# Patient Record
Sex: MALE | Race: WHITE | NOT HISPANIC OR LATINO | Employment: FULL TIME | URBAN - METROPOLITAN AREA
[De-identification: names, ages, dates, MRNs, and addresses within clinical notes are randomized per-mention and may not be internally consistent; named-entity substitution may affect disease eponyms.]

---

## 2017-02-13 NOTE — PROGRESS NOTES
"  SUBJECTIVE:                                                    Sang Koroma is a 41 year old male who presents to clinic today for the following health issues:      HPI    WART(S)     Onset: Had it for a while    Description:   Location: right and left groin, several on skin on bottom to the right of penis, 2 on side of penis, 1 on pelvic region  Number of warts: 11  Painful: no    Accompanying Signs & Symptoms:  Signs of infection: no   History:   History of trauma: no  Prior warts: no         Therapies Tried and outcome: aldara cream - doesn't work; Apple cider vinegar - worked, but burned other parts of genitals.    Problem list and histories reviewed & adjusted, as indicated.  Additional history: as documented    Labs reviewed in EPIC  Problem list, Medication list, Allergies, and Medical/Social/Surgical histories reviewed in [facility] and updated as appropriate.    ROS:  Constitutional, HEENT, cardiovascular, pulmonary, gi and gu systems are negative, except as otherwise noted.    OBJECTIVE:                                                    /80 (BP Location: Right arm, Patient Position: Chair, Cuff Size: Adult Regular)  Pulse 59  Temp 98.4  F (36.9  C) (Temporal)  Resp 18  Ht 5' 7.24\" (1.708 m)  Wt 204 lb 9.6 oz (92.8 kg)  SpO2 100%  BMI 31.81 kg/m2  Body mass index is 31.81 kg/(m^2).  GENERAL: healthy, alert and no distress  MS: no gross musculoskeletal defects noted, no edema  SKIN: 11 warts - genitalia, including 1 right inguinal, 7 right perineum, 2 shaft of penis, 1 left perineum  PSYCH: mentation appears normal, affect normal/bright    Diagnostic Test Results:  none      ASSESSMENT/PLAN:                                                      Sang was seen today for wart and panel management.    Diagnoses and all orders for this visit:    Genital warts  -     DESTRUCT BENIGN LESION, UP TO 14    Patient has tried imiquimod cream for several weeks and then tried to burn the warts with apple " cider vinegar at home.  The vinegar helped a little but did not get rid of warts.  Discussed options of referral to derm for laser removal or using liquid nitrogen in clinic today.  Discussed pain associated with liquid nitrogen treatment and healing process including blistering and pain.  Patient understands risks and wants to proceed with liquid nitrogen destruction of warts.  Return to clinic in one month if warts persist to re-treat.    Procedure note:   Procedure was discussed with patient. Site(s) as described above were identified and cleaned with alcohol.      The site(s) were identified. The canister was held in an upright position with the cryotip nozzle approximately 1 cm away from eash  lesion before the trigger is pressed to activate flow of Liquid Nitrogen.  Three to four freeze-thaw cycles were given with duration of 4-5 sec each till a frost rim of 1mm is noticed around the lesion. A total of 11 warts were treated    See Patient Instructions  Patient Instructions   - Area may blister, if it does, do not pop  - Can re-treat in clinic in one month   - once the blister has healed, if there are still warts, you may use imiquimod or return for another freeze cycle    Lakia Vazquez CNP  [phone]        Guadalupe Vazquez, RHYS [facility]

## 2017-02-15 ENCOUNTER — OFFICE VISIT (OUTPATIENT)
Dept: FAMILY MEDICINE | Facility: OTHER | Age: 42
End: 2017-02-15
Payer: COMMERCIAL

## 2017-02-15 VITALS
BODY MASS INDEX: 32.11 KG/M2 | DIASTOLIC BLOOD PRESSURE: 80 MMHG | TEMPERATURE: 98.4 F | OXYGEN SATURATION: 100 % | HEIGHT: 67 IN | RESPIRATION RATE: 18 BRPM | WEIGHT: 204.6 LBS | SYSTOLIC BLOOD PRESSURE: 118 MMHG | HEART RATE: 59 BPM

## 2017-02-15 DIAGNOSIS — A63.0 GENITAL WARTS: Primary | ICD-10-CM

## 2017-02-15 PROCEDURE — 54065 DESTRUCTION PENIS LESION(S): CPT | Performed by: STUDENT IN AN ORGANIZED HEALTH CARE EDUCATION/TRAINING PROGRAM

## 2017-02-15 ASSESSMENT — PAIN SCALES - GENERAL: PAINLEVEL: NO PAIN (0)

## 2017-02-15 NOTE — NURSING NOTE
"Chief Complaint   Patient presents with     Wart     genital warts     Panel Management     mychart, height, flu, phq9       Initial /80 (BP Location: Right arm, Patient Position: Chair, Cuff Size: Adult Regular)  Pulse 59  Temp 98.4  F (36.9  C) (Temporal)  Resp 18  Ht 5' 7.24\" (1.708 m)  Wt 204 lb 9.6 oz (92.8 kg)  SpO2 100%  BMI 31.81 kg/m2 Estimated body mass index is 31.81 kg/(m^2) as calculated from the following:    Height as of this encounter: 5' 7.24\" (1.708 m).    Weight as of this encounter: 204 lb 9.6 oz (92.8 kg).  Medication Reconciliation: complete   Ruth Truong CMA      "

## 2017-02-15 NOTE — MR AVS SNAPSHOT
"              After Visit Summary   2/15/2017    Sang Koroma    MRN: [id number]           Patient Information     Date Of Birth          1975        Visit Information        Provider Department      2/15/2017 4:00 PM Guadalupe Vazquez APRN CNP [facility]        Care Instructions    - Area may blister, if it does, do not pop  - Can re-treat in clinic in one month   - once the blister has healed, if there are still warts, you may use imiquimod or return for another freeze cycle    Lakia Vazquez CNP  [phone]          Follow-ups after your visit        Who to contact     If you have questions or need follow up information about today's clinic visit or your schedule please contact [facility] directly at [phone].  Normal or non-critical lab and imaging results will be communicated to you by MyChart, letter or phone within 4 business days after the clinic has received the results. If you do not hear from us within 7 days, please contact the clinic through MyChart or phone. If you have a critical or abnormal lab result, we will notify you by phone as soon as possible.  Submit refill requests through Payment plugin or call your pharmacy and they will forward the refill request to us. Please allow 3 business days for your refill to be completed.          Additional Information About Your Visit        MyChart Information     Payment plugin lets you send messages to your doctor, view your test results, renew your prescriptions, schedule appointments and more. To sign up, go to [email or web address] . Click on \"Log in\" on the left side of the screen, which will take you to the Welcome page. Then click on \"Sign up Now\" on the right side of the page.     You will be asked to enter the access code listed below, as well as some personal information. Please follow the directions to create your username and password.     Your access code is: [email or web address]  Expires: 5/16/2017  4:31 " "PM     Your access code will  in 90 days. If you need help or a new code, please call your [facility] or [phone].        Care EveryWhere ID     This is your Care EveryWhere ID. This could be used by other organizations to access your [place] medical records  [id number]        Your Vitals Were     Pulse Temperature Respirations Height Pulse Oximetry BMI (Body Mass Index)    59 98.4  F (36.9  C) (Temporal) 18 5' 7.24\" (1.708 m) 100% 31.81 kg/m2       Blood Pressure from Last 3 Encounters:   02/15/17 118/80   16 106/70   16 118/80    Weight from Last 3 Encounters:   02/15/17 204 lb 9.6 oz (92.8 kg)   16 189 lb (85.7 kg)   16 196 lb 6.4 oz (89.1 kg)              Today, you had the following     No orders found for display       Primary Care Provider Office Phone # Fax #    Mian Hunt [phone]       [address] [address]        Thank you!     Thank you for choosing [facility]  for your care. Our goal is always to provide you with excellent care. Hearing back from our patients is one way we can continue to improve our services. Please take a few minutes to complete the written survey that you may receive in the mail after your visit with us. Thank you!             Your Updated Medication List - Protect others around you: Learn how to safely use, store and throw away your medicines at www.disposemymeds.org.          This list is accurate as of: 2/15/17  4:31 PM.  Always use your most recent med list.                   Brand Name Dispense Instructions for use    acyclovir 400 MG tablet    ZOVIRAX    180 tablet    TAKE 1 TABLET TWICE A DAY       imiquimod 5 % cream    ALDARA    12 packet    Apply a small sized amount to warts three times weekly at bedtime.   Wash off after 8 hours.   May use for up to 16 weeks.       indomethacin 25 MG capsule    INDOCIN    42 capsule    Take 1 capsule (25 mg) by " mouth 3 times daily (with meals)       Multi-vitamin Tabs tablet   Generic drug:  multivitamin, therapeutic with minerals      Take 1 tablet by mouth daily Reported on 2/15/2017       predniSONE 20 MG tablet    DELTASONE    5 tablet    Take 1 tablet (20 mg) by mouth daily

## 2017-02-15 NOTE — PATIENT INSTRUCTIONS
- Area may blister, if it does, do not pop  - Can re-treat in clinic in one month   - once the blister has healed, if there are still warts, you may use imiquimod or return for another freeze cycle    Lakia Vazquez, CNP  [phone]

## 2017-02-16 ASSESSMENT — PATIENT HEALTH QUESTIONNAIRE - PHQ9: SUM OF ALL RESPONSES TO PHQ QUESTIONS 1-9: 1

## 2017-05-27 DIAGNOSIS — A60.00 HERPES SIMPLEX INFECTION OF GENITOURINARY SYSTEM: ICD-10-CM

## 2017-05-31 RX ORDER — ACYCLOVIR 400 MG/1
TABLET ORAL
Qty: 180 TABLET | Refills: 0 | Status: SHIPPED | OUTPATIENT
Start: 2017-05-31 | End: 2017-09-21

## 2017-05-31 NOTE — TELEPHONE ENCOUNTER
Prescription approved per [facility] Refill Protocol.  Patient due for labs for further refills.  Lien Youngblood RN - BC

## 2017-05-31 NOTE — TELEPHONE ENCOUNTER
acyclovir (ZOVIRAX) 400 MG tablet     Last Written Prescription Date: 12/20/2016  Last Fill Quantity: 180, # refills: 1  Last Office Visit with FMLISA, NORIS or [facility] prescribing provider: 02/15/2017        Creatinine   Date Value Ref Range Status   06/30/2016 1.24 0.66 - 1.25 mg/dL Final     An OLVIN Garrett

## 2017-09-04 DIAGNOSIS — A60.00 HERPES SIMPLEX INFECTION OF GENITOURINARY SYSTEM: ICD-10-CM

## 2017-09-05 NOTE — TELEPHONE ENCOUNTER
acyclovir (ZOVIRAX) 400 MG tablet     Last Written Prescription Date: 5/31/17  Last Fill Quantity: 180, # refills: 0  Last Office Visit with G, [facility] or [facility] prescribing provider: 2/15/17        Creatinine   Date Value Ref Range Status   06/30/2016 1.24 0.66 - 1.25 mg/dL Final

## 2017-09-06 RX ORDER — ACYCLOVIR 400 MG/1
TABLET ORAL
Qty: 1 TABLET | Refills: 0 | OUTPATIENT
Start: 2017-09-06

## 2017-09-06 NOTE — TELEPHONE ENCOUNTER
Pt goes to [facility]. Pharmacy advised to send request there.  Vani Quintanilla RN

## 2017-09-21 ENCOUNTER — OFFICE VISIT (OUTPATIENT)
Dept: FAMILY MEDICINE | Facility: OTHER | Age: 42
End: 2017-09-21
Payer: COMMERCIAL

## 2017-09-21 VITALS
HEART RATE: 56 BPM | DIASTOLIC BLOOD PRESSURE: 80 MMHG | BODY MASS INDEX: 30.92 KG/M2 | SYSTOLIC BLOOD PRESSURE: 110 MMHG | RESPIRATION RATE: 16 BRPM | TEMPERATURE: 97.9 F | WEIGHT: 197 LBS | HEIGHT: 67 IN

## 2017-09-21 DIAGNOSIS — R51.9 NONINTRACTABLE EPISODIC HEADACHE, UNSPECIFIED HEADACHE TYPE: ICD-10-CM

## 2017-09-21 DIAGNOSIS — Z23 NEED FOR PROPHYLACTIC VACCINATION AND INOCULATION AGAINST INFLUENZA: ICD-10-CM

## 2017-09-21 DIAGNOSIS — A60.00 HERPES SIMPLEX INFECTION OF GENITOURINARY SYSTEM: ICD-10-CM

## 2017-09-21 DIAGNOSIS — Z00.00 ENCOUNTER FOR ROUTINE ADULT HEALTH EXAMINATION WITHOUT ABNORMAL FINDINGS: ICD-10-CM

## 2017-09-21 DIAGNOSIS — R79.89 ELEVATED SERUM CREATININE: ICD-10-CM

## 2017-09-21 DIAGNOSIS — M1A.9XX0 CHRONIC GOUT INVOLVING TOE OF RIGHT FOOT WITHOUT TOPHUS, UNSPECIFIED CAUSE: ICD-10-CM

## 2017-09-21 DIAGNOSIS — F32.5 MAJOR DEPRESSIVE DISORDER WITH SINGLE EPISODE, IN FULL REMISSION (H): ICD-10-CM

## 2017-09-21 DIAGNOSIS — Z13.220 SCREENING FOR LIPOID DISORDERS: Primary | ICD-10-CM

## 2017-09-21 PROCEDURE — 99395 PREV VISIT EST AGE 18-39: CPT | Mod: 25 | Performed by: FAMILY MEDICINE

## 2017-09-21 PROCEDURE — 90471 IMMUNIZATION ADMIN: CPT | Performed by: FAMILY MEDICINE

## 2017-09-21 PROCEDURE — 90686 IIV4 VACC NO PRSV 0.5 ML IM: CPT | Performed by: FAMILY MEDICINE

## 2017-09-21 RX ORDER — ACYCLOVIR 400 MG/1
400 TABLET ORAL 2 TIMES DAILY
Qty: 180 TABLET | Refills: 3 | Status: SHIPPED | OUTPATIENT
Start: 2017-09-21 | End: 2017-10-12

## 2017-09-21 ASSESSMENT — ANXIETY QUESTIONNAIRES
1. FEELING NERVOUS, ANXIOUS, OR ON EDGE: SEVERAL DAYS
GAD7 TOTAL SCORE: 6
4. TROUBLE RELAXING: MORE THAN HALF THE DAYS
6. BECOMING EASILY ANNOYED OR IRRITABLE: SEVERAL DAYS
7. FEELING AFRAID AS IF SOMETHING AWFUL MIGHT HAPPEN: NOT AT ALL
2. NOT BEING ABLE TO STOP OR CONTROL WORRYING: NOT AT ALL
5. BEING SO RESTLESS THAT IT IS HARD TO SIT STILL: SEVERAL DAYS
7. FEELING AFRAID AS IF SOMETHING AWFUL MIGHT HAPPEN: NOT AT ALL
3. WORRYING TOO MUCH ABOUT DIFFERENT THINGS: SEVERAL DAYS
GAD7 TOTAL SCORE: 6
GAD7 TOTAL SCORE: 6

## 2017-09-21 ASSESSMENT — PAIN SCALES - GENERAL: PAINLEVEL: NO PAIN (0)

## 2017-09-21 ASSESSMENT — PATIENT HEALTH QUESTIONNAIRE - PHQ9
SUM OF ALL RESPONSES TO PHQ QUESTIONS 1-9: 6
10. IF YOU CHECKED OFF ANY PROBLEMS, HOW DIFFICULT HAVE THESE PROBLEMS MADE IT FOR YOU TO DO YOUR WORK, TAKE CARE OF THINGS AT HOME, OR GET ALONG WITH OTHER PEOPLE: NOT DIFFICULT AT ALL
SUM OF ALL RESPONSES TO PHQ QUESTIONS 1-9: 6

## 2017-09-21 NOTE — NURSING NOTE
"Chief Complaint   Patient presents with     Physical     Health Maintenance       Initial /80  Pulse 56  Temp 97.9  F (36.6  C) (Oral)  Resp 16  Ht 5' 7\" (1.702 m)  Wt 197 lb (89.4 kg)  BMI 30.85 kg/m2 Estimated body mass index is 30.85 kg/(m^2) as calculated from the following:    Height as of this encounter: 5' 7\" (1.702 m).    Weight as of this encounter: 197 lb (89.4 kg).  Medication Reconciliation: complete  Arnold Winter MA    "

## 2017-09-21 NOTE — PROGRESS NOTES
SUBJECTIVE:   CC: Sang Koroma is an 41 year old male who presents for preventative health visit.     Physical   Annual:     Getting at least 3 servings of Calcium per day::  NO    Bi-annual eye exam::  Yes    Dental care twice a year::  Yes    Sleep apnea or symptoms of sleep apnea::  None    Diet::  Regular (no restrictions)    Frequency of exercise::  1 day/week    Duration of exercise::  Other    Taking medications regularly::  Yes    Medication side effects::  None    Additional concerns today::  YES            -------------------------------------    Today's PHQ-2 Score:   PHQ-2 ( 1999 Pf[employer]) 9/21/2017   Q1: Little interest or pleasure in doing things 1   Q2: Feeling down, depressed or hopeless 1   PHQ-2 Score 2   Q1: Little interest or pleasure in doing things Several days   Q2: Feeling down, depressed or hopeless Several days   PHQ-2 Score 2       Abuse: Current or Past(Physical, Sexual or Emotional)- No  Do you feel safe in your environment - Yes    Social History   Substance Use Topics     Smoking status: Never Smoker     Smokeless tobacco: Never Used     Alcohol use Yes      Comment: occ     The patient does not drink >3 drinks per day nor >7 drinks per week.    Last PSA: No results found for: PSA    Reviewed orders with patient. Reviewed health maintenance and updated orders accordingly - Yes  Labs reviewed in EPIC  BP Readings from Last 3 Encounters:   09/21/17 110/80   02/15/17 118/80   08/09/16 106/70    Wt Readings from Last 3 Encounters:   09/21/17 197 lb (89.4 kg)   02/15/17 204 lb 9.6 oz (92.8 kg)   08/09/16 189 lb (85.7 kg)                  Patient Active Problem List   Diagnosis     Major depressive disorder in full remission (H)     Medial tibial stress syndrome     Elevated serum creatinine     Gout involving toe of right foot     Herpes simplex infection of genitourinary system     History reviewed. No pertinent surgical history.    Social History   Substance Use Topics     Smoking  status: Never Smoker     Smokeless tobacco: Never Used     Alcohol use Yes      Comment: occ     Family History   Problem Relation Age of Onset     CANCER Maternal Grandfather      CANCER Paternal Grandmother      CANCER Paternal Grandfather      Gout Brother      Crohn Disease Brother          Current Outpatient Prescriptions   Medication Sig Dispense Refill     acyclovir (ZOVIRAX) 400 MG tablet Take 1 tablet (400 mg) by mouth 2 times daily 180 tablet 3     [DISCONTINUED] acyclovir (ZOVIRAX) 400 MG tablet TAKE 1 TABLET TWICE A  tablet 0     imiquimod (ALDARA) 5 % cream Apply a small sized amount to warts three times weekly at bedtime.   Wash off after 8 hours.   May use for up to 16 weeks. (Patient not taking: Reported on 9/21/2017) 12 packet 3     indomethacin (INDOCIN) 25 MG capsule Take 1 capsule (25 mg) by mouth 3 times daily (with meals) (Patient not taking: Reported on 9/21/2017) 42 capsule 1     No Known Allergies  Recent Labs   Lab Test  06/30/16   1137  06/03/15   1130   LDL   --   147*   HDL   --   30*   TRIG   --   137   ALT   --   34   CR  1.24  1.31*   GFRESTIMATED  65  61   GFRESTBLACK  79  74   POTASSIUM   --   4.4              Reviewed and updated as needed this visit by clinical staffTobacco  Allergies  Med Hx  Surg Hx  Fam Hx  Soc Hx        Reviewed and updated as needed this visit by Provider          History reviewed. No pertinent past medical history.   History reviewed. No pertinent surgical history.      ROS:  C: NEGATIVE for fever, chills, change in weight  I: NEGATIVE for worrisome rashes, moles or lesions  E: NEGATIVE for vision changes or irritation  ENT: NEGATIVE for ear, mouth and throat problems  R: NEGATIVE for significant cough or SOB  CV: NEGATIVE for chest pain, palpitations or peripheral edema  GI: NEGATIVE for nausea, abdominal pain, heartburn, or change in bowel habits   male: negative for dysuria, hematuria, decreased urinary stream, erectile dysfunction, urethral  "discharge  M: NEGATIVE for significant arthralgias or myalgia  N: NEGATIVE for weakness, dizziness or paresthesias  P: NEGATIVE for changes in mood or affect    OBJECTIVE:   /80  Pulse 56  Temp 97.9  F (36.6  C) (Oral)  Resp 16  Ht 5' 7\" (1.702 m)  Wt 197 lb (89.4 kg)  BMI 30.85 kg/m2    EXAM:  Physical Exam   Constitutional: He is oriented to person, place, and time. He appears well-developed and well-nourished.   HENT:   Head: Normocephalic and atraumatic.   Right Ear: External ear normal.   Left Ear: External ear normal.   Mouth/Throat: Oropharynx is clear and moist.   Eyes: EOM are normal.   Neck: Neck supple.   Cardiovascular: Normal rate and regular rhythm.    Pulmonary/Chest: Effort normal and breath sounds normal.   Abdominal: Soft. Bowel sounds are normal.   Musculoskeletal: Normal range of motion.   Neurological: He is alert and oriented to person, place, and time.   Psychiatric: He has a normal mood and affect.       ASSESSMENT/PLAN:     Problem List Items Addressed This Visit     Major depressive disorder in full remission (H)     In remission          Elevated serum creatinine     One time finding. Recheck a yr later was normal  Recheck for close follow up           Gout involving toe of right foot     Diagnosed last yr  Flare up once every 3 months   Recheck uric acid  Diet controlled  Can refill indomethacin            Relevant Orders    Uric acid    Comprehensive metabolic panel (BMP + Alb, Alk Phos, ALT, AST, Total. Bili, TP)    Herpes simplex infection of genitourinary system     Flare up once a yr.  Have been out of it   Refills provided         Relevant Medications    acyclovir (ZOVIRAX) 400 MG tablet    Nonintractable episodic headache     Likely tension headache -   Advised headache log  Recheck in 3 months           Other Visit Diagnoses     Screening for lipoid disorders    -  Primary    Relevant Orders    Lipid panel reflex to direct LDL    Encounter for routine adult health " "examination without abnormal findings              COUNSELING:   Reviewed preventive health counseling, as reflected in patient instructions       Regular exercise       Healthy diet/nutrition       Vision screening       Hearing screening           reports that he has never smoked. He has never used smokeless tobacco.      Estimated body mass index is 30.85 kg/(m^2) as calculated from the following:    Height as of this encounter: 5' 7\" (1.702 m).    Weight as of this encounter: 197 lb (89.4 kg).         Counseling Resources:  ATP IV Guidelines  Pooled Cohorts Equation Calculator  FRAX Risk Assessment  ICSI Preventive Guidelines  Dietary Guidelines for Americans, 2010  USDA's MyPlate  ASA Prophylaxis  Lung CA Screening    Selam Sandoval MD  [facility]  "

## 2017-09-21 NOTE — MR AVS SNAPSHOT
After Visit Summary   9/21/2017    Sang Koroma    MRN: [id number]           Patient Information     Date Of Birth          1975        Visit Information        Provider Department      9/21/2017 5:20 PM Selam Sandoval MD [facility]        Today's Diagnoses     Screening for lipoid disorders    -  1    Major depressive disorder with single episode, in full remission (H)        Herpes simplex infection of genitourinary system        Chronic gout involving toe of right foot without tophus, unspecified cause        Elevated serum creatinine        Encounter for routine adult health examination without abnormal findings        Nonintractable episodic headache, unspecified headache type           Follow-ups after your visit        Follow-up notes from your care team     Return in about 3 months (around 12/21/2017).      Future tests that were ordered for you today     Open Future Orders        Priority Expected Expires Ordered    Uric acid Routine  9/21/2018 9/21/2017    Comprehensive metabolic panel (BMP + Alb, Alk Phos, ALT, AST, Total. Bili, TP) Routine  9/21/2018 9/21/2017    Lipid panel reflex to direct LDL Routine  9/21/2018 9/21/2017            Who to contact     If you have questions or need follow up information about today's clinic visit or your schedule please contact [facility] directly at [phone].  Normal or non-critical lab and imaging results will be communicated to you by MyChart, letter or phone within 4 business days after the clinic has received the results. If you do not hear from us within 7 days, please contact the clinic through MyChart or phone. If you have a critical or abnormal lab result, we will notify you by phone as soon as possible.  Submit refill requests through [employer] or call your pharmacy and they will forward the refill request to us. Please allow 3 business days for your refill to be completed.          Additional  "Information About Your Visit        MyChart Information     [employer] lets you send messages to your doctor, view your test results, renew your prescriptions, schedule appointments and more. To sign up, go to [email or web address] . Click on \"Log in\" on the left side of the screen, which will take you to the Welcome page. Then click on \"Sign up Now\" on the right side of the page.     You will be asked to enter the access code listed below, as well as some personal information. Please follow the directions to create your username and password.     Your access code is: [email or web address]  Expires: 2017  6:16 PM     Your access code will  in 90 days. If you need help or a new code, please call your [place] clinic or [phone].        Care EveryWhere ID     This is your Care EveryWhere ID. This could be used by other organizations to access your [place] medical records  [id number]        Your Vitals Were     Pulse Temperature Respirations Height BMI (Body Mass Index)       56 97.9  F (36.6  C) (Oral) 16 5' 7\" (1.702 m) 30.85 kg/m2        Blood Pressure from Last 3 Encounters:   17 110/80   02/15/17 118/80   16 106/70    Weight from Last 3 Encounters:   17 197 lb (89.4 kg)   02/15/17 204 lb 9.6 oz (92.8 kg)   16 189 lb (85.7 kg)                 Today's Medication Changes          These changes are accurate as of: 17  6:16 PM.  If you have any questions, ask your nurse or doctor.               These medicines have changed or have updated prescriptions.        Dose/Directions    acyclovir 400 MG tablet   Commonly known as:  ZOVIRAX   This may have changed:  See the new instructions.   Used for:  Herpes simplex infection of genitourinary system   Changed by:  Selam Sandoval MD        Dose:  400 mg   Take 1 tablet (400 mg) by mouth 2 times daily   Quantity:  180 tablet   Refills:  3         Stop taking these medicines if you haven't already. Please contact your care team if you " have questions.     Multi-vitamin Tabs tablet   Generic drug:  multivitamin, therapeutic with minerals   Stopped by:  Selam Sandoval MD                Where to get your medicines      These [place] [facility] [place] - [address] [place] [address]  [address] [facility] [address]     Phone:  [phone]     acyclovir 400 MG tablet                Primary Care Provider Office Phone # Fax #    Mian Hunt [phone] [address] [facility]  [facility] [address]        Equal Access to Services     [facility]: Hadii aad ku hadasho Soomaali, waaxda luqadaha, qaybta kaalmada aderupa, randal martinez . So [facility] [phone].    ATENCIÓN: Si habla español, tiene a singh disposición servicios gratuitos de asistencia lingüística. [facility] [phone].    We comply with applicable federal civil rights laws and [place] laws. We do not discriminate on the basis of race, color, national origin, age, disability sex, sexual orientation or gender identity.            Thank you!     Thank you for choosing [facility]  for your care. Our goal is always to provide you with excellent care. Hearing back from our patients is one way we can continue to improve our services. Please take a few minutes to complete the written survey that you may receive in the mail after your visit with us. Thank you!             Your Updated Medication List - Protect others around you: Learn how to safely use, store and throw away your medicines at www.disposemymeds.org.          This list is accurate as of: 9/21/17  6:16 PM.  Always use your most recent med list.                   Brand Name Dispense Instructions for use Diagnosis    acyclovir 400 MG tablet    ZOVIRAX    180 tablet    Take 1 tablet (400 mg) by mouth 2 times daily    Herpes simplex infection of genitourinary system       imiquimod 5 % cream    ALDARA    12 packet    Apply a small sized  amount to warts three times weekly at bedtime.   Wash off after 8 hours.   May use for up to 16 weeks.    Genital warts       indomethacin 25 MG capsule    INDOCIN    42 capsule    Take 1 capsule (25 mg) by mouth 3 times daily (with meals)    Right foot pain

## 2017-09-21 NOTE — ASSESSMENT & PLAN NOTE
Diagnosed last yr  Flare up once every 3 months   Recheck uric acid  Diet controlled  Can refill indomethacin

## 2017-09-22 ASSESSMENT — PATIENT HEALTH QUESTIONNAIRE - PHQ9: SUM OF ALL RESPONSES TO PHQ QUESTIONS 1-9: 6

## 2017-09-22 ASSESSMENT — ANXIETY QUESTIONNAIRES: GAD7 TOTAL SCORE: 6

## 2017-09-22 NOTE — PROGRESS NOTES
Injectable Influenza Immunization Documentation    1.  Is the person to be vaccinated sick today?   No    2. Does the person to be vaccinated have an allergy to a component   of the vaccine?   No    3. Has the person to be vaccinated ever had a serious reaction   to influenza vaccine in the past?   No    4. Has the person to be vaccinated ever had Guillain-Barré syndrome?   No    Form completed by Arnold Winter MA

## 2017-10-12 ENCOUNTER — TELEPHONE (OUTPATIENT)
Dept: FAMILY MEDICINE | Facility: OTHER | Age: 42
End: 2017-10-12

## 2017-10-12 DIAGNOSIS — A60.00 HERPES SIMPLEX INFECTION OF GENITOURINARY SYSTEM: ICD-10-CM

## 2017-10-12 RX ORDER — ACYCLOVIR 400 MG/1
400 TABLET ORAL 2 TIMES DAILY
Qty: 180 TABLET | Refills: 3 | Status: SHIPPED | OUTPATIENT
Start: 2017-10-12

## 2017-10-12 NOTE — TELEPHONE ENCOUNTER
Acyclovir tabs      Last Written Prescription Date: 09/21/17  Last Fill Quantity: 180,  # refills: 3   Last Office Visit with G, P or [facility] prescribing provider: 09/21/17                                             Patient now going through express [facility] and mail order does a 90 day supply and add refills if appropriate.

## 2017-10-17 DIAGNOSIS — M1A.9XX0 CHRONIC GOUT INVOLVING TOE OF RIGHT FOOT WITHOUT TOPHUS, UNSPECIFIED CAUSE: ICD-10-CM

## 2017-10-17 DIAGNOSIS — Z13.220 SCREENING FOR LIPOID DISORDERS: ICD-10-CM

## 2017-10-17 LAB
ALBUMIN SERPL-MCNC: 4.5 G/DL (ref 3.4–5)
ALP SERPL-CCNC: 67 U/L (ref 40–150)
ALT SERPL W P-5'-P-CCNC: 46 U/L (ref 0–70)
ANION GAP SERPL CALCULATED.3IONS-SCNC: 8 MMOL/L (ref 3–14)
AST SERPL W P-5'-P-CCNC: 21 U/L (ref 0–45)
BILIRUB SERPL-MCNC: 0.6 MG/DL (ref 0.2–1.3)
BUN SERPL-MCNC: 18 MG/DL (ref 7–30)
CALCIUM SERPL-MCNC: 9.6 MG/DL (ref 8.5–10.1)
CHLORIDE SERPL-SCNC: 103 MMOL/L (ref 94–109)
CHOLEST SERPL-MCNC: 225 MG/DL
CO2 SERPL-SCNC: 30 MMOL/L (ref 20–32)
CREAT SERPL-MCNC: 1.16 MG/DL (ref 0.66–1.25)
GFR SERPL CREATININE-BSD FRML MDRD: 70 ML/MIN/1.7M2
GLUCOSE SERPL-MCNC: 71 MG/DL (ref 70–99)
HDLC SERPL-MCNC: 43 MG/DL
LDLC SERPL CALC-MCNC: 146 MG/DL
NONHDLC SERPL-MCNC: 182 MG/DL
POTASSIUM SERPL-SCNC: 4.2 MMOL/L (ref 3.4–5.3)
PROT SERPL-MCNC: 8 G/DL (ref 6.8–8.8)
SODIUM SERPL-SCNC: 141 MMOL/L (ref 133–144)
TRIGL SERPL-MCNC: 179 MG/DL
URATE SERPL-MCNC: 8.3 MG/DL (ref 3.5–7.2)

## 2017-10-17 PROCEDURE — 84550 ASSAY OF BLOOD/URIC ACID: CPT | Performed by: FAMILY MEDICINE

## 2017-10-17 PROCEDURE — 80053 COMPREHEN METABOLIC PANEL: CPT | Performed by: FAMILY MEDICINE

## 2017-10-17 PROCEDURE — 80061 LIPID PANEL: CPT | Performed by: FAMILY MEDICINE

## 2017-10-17 PROCEDURE — 36415 COLL VENOUS BLD VENIPUNCTURE: CPT | Performed by: FAMILY MEDICINE

## 2017-12-21 ENCOUNTER — TELEPHONE (OUTPATIENT)
Dept: DERMATOLOGY | Facility: CLINIC | Age: 42
End: 2017-12-21

## 2017-12-21 NOTE — TELEPHONE ENCOUNTER
Patient would like his genital warts cut off. Who would be best for him to schedule with? Please follow up with patient tomorrow.

## 2017-12-22 NOTE — TELEPHONE ENCOUNTER
Patient returned call and I informed him of the information below.  I scheduled him to see Dr. Simental in January.    Agnes Koehler, CMA

## 2017-12-22 NOTE — TELEPHONE ENCOUNTER
I left a message for patient to call [facility].  Per chart review, PCP has prescribed Imiquimod and warts have been treated with liquid nitrogen.  PCP discussed referral to derm for laser removal of warts.    Per discussion with Dr. Simental, patient needs derm evaluation and treatment would entail liquid nitrogen and visits every 2-4 weeks for 1 year or until resolution of warts.  We do not laser genital warts.    Ana Rosa Calvert RN

## 2018-01-11 ENCOUNTER — OFFICE VISIT (OUTPATIENT)
Dept: DERMATOLOGY | Facility: CLINIC | Age: 43
End: 2018-01-11
Payer: COMMERCIAL

## 2018-01-11 VITALS — HEIGHT: 67 IN | BODY MASS INDEX: 30.92 KG/M2 | WEIGHT: 197 LBS

## 2018-01-11 DIAGNOSIS — A63.0 GENITAL WARTS: Primary | ICD-10-CM

## 2018-01-11 PROCEDURE — 36415 COLL VENOUS BLD VENIPUNCTURE: CPT | Performed by: DERMATOLOGY

## 2018-01-11 PROCEDURE — 87389 HIV-1 AG W/HIV-1&-2 AB AG IA: CPT | Performed by: DERMATOLOGY

## 2018-01-11 PROCEDURE — 17110 DESTRUCTION B9 LES UP TO 14: CPT | Performed by: DERMATOLOGY

## 2018-01-11 PROCEDURE — 86780 TREPONEMA PALLIDUM: CPT | Performed by: DERMATOLOGY

## 2018-01-11 ASSESSMENT — PAIN SCALES - GENERAL: PAINLEVEL: NO PAIN (0)

## 2018-01-11 NOTE — MR AVS SNAPSHOT
After Visit Summary   1/11/2018    Sang Koroma    MRN: [id number]           Patient Information     Date Of Birth          1975        Visit Information        Provider Department      1/11/2018 4:30 PM Shasha Simental MD [facility]        Today's Diagnoses     Genital warts    -  1      Care Instructions    Cryotherapy    What is it?    Use of a very cold liquid, such as liquid nitrogen, to freeze and destroy abnormal skin cells that need to be removed    What should I expect?    Tenderness and redness    A small blister that might grow and fill with dark purple blood. There may be crusting.    More than one treatment may be needed if the lesions do not go away.    How do I care for the treated area?    Gently wash the area with your hands when bathing.    Use a thin layer of Vaseline to help with healing. You may use a Band-Aid.     The area should heal within 7-10 days and may leave behind a pink or lighter color.     Do not use an antibiotic or Neosporin ointment.     You may take acetaminophen (Tylenol) for pain.     Call your Doctor if you have:    Severe pain    Signs of infection (warmth, redness, cloudy yellow drainage, and or a bad smell)    Questions or concerns    Who should I call with questions?       [facility]: [phone]       [facility]: [phone]       For urgent needs outside of business hours call the [facility] at [phone]        and ask for the dermatology resident on call            Follow-ups after your visit        Follow-up notes from your care team     Return in about 6 weeks (around 2/22/2018) for 6 Wk F/U - Genital Warts.      Your next 10 appointments already scheduled     Feb 06, 2018  4:30 PM CST   [facility]    [address] [address] [place] [address] [phone]     "          Who to contact     If you have questions or need follow up information about today's clinic visit or your schedule please contact [facility] directly at [phone].  Normal or non-critical lab and imaging results will be communicated to you by MyChart, letter or phone within 4 business days after the clinic has received the results. If you do not hear from us within 7 days, please contact the clinic through MyChart or phone. If you have a critical or abnormal lab result, we will notify you by phone as soon as possible.  Submit refill requests through [employer] or call your pharmacy and they will forward the refill request to us. Please allow 3 business days for your refill to be completed.          Additional Information About Your Visit        [employer] Information     [employer] is an electronic gateway that provides easy, online access to your medical records. With [employer], you can request a clinic appointment, read your test results, renew a prescription or communicate with your care team.     To sign up for [employer] visit the website at [email or web address]   You will be asked to enter the access code listed below, as well as some personal information. Please follow the directions to create your username and password.     Your access code is: [email or web address]  Expires: 2018  4:35 PM     Your access code will  in 90 days. If you need help or a new code, please contact your [facility] or call [phone] for assistance.        Care EveryWhere ID     This is your Care EveryWhere ID. This could be used by other organizations to access your [place] medical records  [id number]        Your Vitals Were     Height BMI (Body Mass Index)                1.702 m (5' 7\") 30.85 kg/m2           Blood Pressure from Last 3 Encounters:   17 110/80   02/15/17 118/80   16 106/70    Weight from Last 3 Encounters:   18 89.4 kg (197 lb)   17 " 89.4 kg (197 lb)   02/15/17 92.8 kg (204 lb 9.6 oz)              Today, you had the following     No orders found for display       Primary Care Provider Office Phone # Fax #    Mian Hunt [phone] [address] [facility] [place] [address]        Equal Access to Services     [facility]: Hadii aad ku hadasho Soomaali, waaxda luqadaha, qaybta kaalmada adeegyada, waxay idiin hayaan adeeg khanalisash la'aan . So [facility] [phone].    ATENCIÓN: Si habla español, tiene a singh disposición servicios gratuitos de asistencia lingüística. Margaritaame al [phone].    We comply with applicable federal civil rights laws and [place] laws. We do not discriminate on the basis of race, color, national origin, age, disability, sex, sexual orientation, or gender identity.            Thank you!     Thank you for choosing [facility]  for your care. Our goal is always to provide you with excellent care. Hearing back from our patients is one way we can continue to improve our services. Please take a few minutes to complete the written survey that you may receive in the mail after your visit with us. Thank you!             Your Updated Medication List - Protect others around you: Learn how to safely use, store and throw away your medicines at www.disposemymeds.org.          This list is accurate as of: 1/11/18  4:35 PM.  Always use your most recent med list.                   Brand Name Dispense Instructions for use Diagnosis    acyclovir 400 MG tablet    ZOVIRAX    180 tablet    Take 1 tablet (400 mg) by mouth 2 times daily    Herpes simplex infection of genitourinary system       imiquimod 5 % cream    ALDARA    12 packet    Apply a small sized amount to warts three times weekly at bedtime.   Wash off after 8 hours.   May use for up to 16 weeks.    Genital warts       indomethacin 25 MG capsule    INDOCIN    42 capsule    Take 1 capsule (25 mg) by mouth 3 times daily (with meals)     Right foot pain

## 2018-01-11 NOTE — PROGRESS NOTES
[facility] Dermatology Note      Dermatology Problem List:  1. Genital Warts  -s/p imiquimod with no results  -s/p cryotherapy with PCP with no results  2. HSV positive per patient    Encounter Date: Jan 11, 2018    CC:  Chief Complaint   Patient presents with     Consult     genital warts          History of Present Illness:  Mr. Sang Koroma is a 42 year old male who presents for evaluation of genital warts.  He has had 1 treatment of cryotherapy.  He also has had tried imiquimod for 6 weeks.  He states that he never got any reaction to the medication.  He was diagnosed by his primary care provider. Cryotherapy did not work.     He has no other lesions of concern today.    Past Medical History:   Patient Active Problem List   Diagnosis     Major depressive disorder in full remission (H)     Medial tibial stress syndrome     Elevated serum creatinine     Gout involving toe of right foot     Herpes simplex infection of genitourinary system     Nonintractable episodic headache     No past medical history on file.  No past surgical history on file.    Social History:  The patient works as an . The patient denies use of tanning beds.    Family History:  There is no family history of skin cancer.    Medications:  Current Outpatient Prescriptions   Medication Sig Dispense Refill     acyclovir (ZOVIRAX) 400 MG tablet Take 1 tablet (400 mg) by mouth 2 times daily 180 tablet 3     indomethacin (INDOCIN) 25 MG capsule Take 1 capsule (25 mg) by mouth 3 times daily (with meals) 42 capsule 1     imiquimod (ALDARA) 5 % cream Apply a small sized amount to warts three times weekly at bedtime.   Wash off after 8 hours.   May use for up to 16 weeks. (Patient not taking: Reported on 1/11/2018) 12 packet 3       No Known Allergies    Review of Systems:  -Constitutional: The patient denies feels well otherwise today  -Skin: As above in HPI. No additional skin concerns.    Physical exam:  Vitals:  "Ht 1.702 m (5' 7\")  Wt 89.4 kg (197 lb)  BMI 30.85 kg/m2  GEN: This is a well developed, well-nourished male in no acute distress, in a pleasant mood.    SKIN: Focused examination of the genital area was performed.  Agnes Koehler CMA was present.  -Verrucous papules - flat and skin colored - scattered on the right groin, scrotum and perineum  -No other lesions of concern on areas examined.     Impression/Plan:  1. Genital Warts - Scattered in the right groin, scrotum &perineum    Cryotherapy procedure note: After verbal consent and discussion of risks and benefits including but no limited to dyspigmentation/scar, blister, and pain, 32 were treated with 1-2mm freeze border for 2 cycles with liquid nitrogen. Post cryotherapy instructions were provided.     HIV and RPR testing    Recommend he inform partner    Follow-up in 3-4 weeks, earlier for new or changing lesions.     Staff Involved:  Scribe/Staff    Scribe Disclosure:   I, Agnes Koehler, jazmine serving as a scribe to document services personally performed by Dr. Shasha Simental, based on data collection and the provider's statements to me.     Provider Disclosure:   The documentation recorded by the scribe accurately reflects the services I personally performed and the decisions made by me.    Shasha Simental MD    Department of Dermatology  [facility]: Phone: [phone], Fax:[phone]  [facility]: Phone: [phone], Fax: [phone]      "

## 2018-01-11 NOTE — NURSING NOTE
"Sang Koroma's goals for this visit include:   Chief Complaint   Patient presents with     Consult     genital warts        He requests these members of his care team be copied on today's visit information: yes    PCP: Mian Hunt    Referring Provider:  No referring provider defined for this encounter.    Chief Complaint   Patient presents with     Consult     genital warts        Initial Ht 1.702 m (5' 7\")  Wt 89.4 kg (197 lb)  BMI 30.85 kg/m2 Estimated body mass index is 30.85 kg/(m^2) as calculated from the following:    Height as of this encounter: 1.702 m (5' 7\").    Weight as of this encounter: 89.4 kg (197 lb).  Medication Reconciliation: complete    Do you need any medication refills at today's visit? Yes    Amkenny Meneses CMA        "

## 2018-01-11 NOTE — LETTER
January 12, 2018      Sang Koroma  [address] [facility] [address]                Dear Sang Koroma,     We are writing to inform you of your test results that show normal labs.     Thank you for taking the time to be seen in our dermatology clinic. If you have further questions or concerns, please contact the clinic(see phone number listed below).       Sincerely,     Shasha Simental MD      Department of Dermatology   [facility]: Phone: [phone], Fax:[phone]   [facility]: Phone: [phone], Fax: [phone]

## 2018-01-11 NOTE — PATIENT INSTRUCTIONS
Cryotherapy    What is it?    Use of a very cold liquid, such as liquid nitrogen, to freeze and destroy abnormal skin cells that need to be removed    What should I expect?    Tenderness and redness    A small blister that might grow and fill with dark purple blood. There may be crusting.    More than one treatment may be needed if the lesions do not go away.    How do I care for the treated area?    Gently wash the area with your hands when bathing.    Use a thin layer of Vaseline to help with healing. You may use a Band-Aid.     The area should heal within 7-10 days and may leave behind a pink or lighter color.     Do not use an antibiotic or Neosporin ointment.     You may take acetaminophen (Tylenol) for pain.     Call your Doctor if you have:    Severe pain    Signs of infection (warmth, redness, cloudy yellow drainage, and or a bad smell)    Questions or concerns    Who should I call with questions?       [facility]: [phone]       [facility]: [phone]       For urgent needs outside of business hours call the [facility] at [phone]        and ask for the dermatology resident on call

## 2018-01-12 LAB
HIV 1+2 AB+HIV1 P24 AG SERPL QL IA: NONREACTIVE
T PALLIDUM IGG+IGM SER QL: NEGATIVE

## 2018-02-06 ENCOUNTER — OFFICE VISIT (OUTPATIENT)
Dept: DERMATOLOGY | Facility: CLINIC | Age: 43
End: 2018-02-06
Payer: COMMERCIAL

## 2018-02-06 DIAGNOSIS — A63.0 GENITAL WARTS: Primary | ICD-10-CM

## 2018-02-06 PROCEDURE — 54056 CRYOSURGERY PENIS LESION(S): CPT | Performed by: DERMATOLOGY

## 2018-02-06 ASSESSMENT — PAIN SCALES - GENERAL: PAINLEVEL: NO PAIN (0)

## 2018-02-06 NOTE — MR AVS SNAPSHOT
After Visit Summary   2/6/2018    Sang Koroma    MRN: [id number]           Patient Information     Date Of Birth          1975        Visit Information        Provider Department      2/6/2018 4:30 PM Shasha Simental MD [facility]        Care Instructions    CRYOTHERAPY    What is it?    Use of a very cold liquid, such as liquid nitrogen, to freeze and destroy abnormal skin cells that need to be removed    What should I expect?    Tenderness and redness    A small blister that might grow and fill with dark purple blood. There may be crusting.    More than one treatment may be needed if the lesions do not go away.    How do I care for the treated area?    Gently wash the area with your hands when bathing.    Use a thin layer of Vaselin to help with healing. You may use a Band-Aid.     The area should heal within 7-10 days.    Do not use an antibiotic or Neosporin ointment.     You may take acetaminophen (Tylenol) for pain.     Call your Doctor if you have:    Severe pain    Signs of infection (warmth, redness, cloudy yellow drainage, and or a bad smell)    Questions or concerns    Contact infromation:  [facility] [phone]  [facility] [phone]            Follow-ups after your visit        Your next 10 appointments already scheduled     Mar 06, 2018  4:15 PM CST   [facility] Visit with [facility] Simental MD   [facility]    [address] [place] [address]   [phone]              Who to contact     If you have questions or need follow up information about today's clinic visit or your schedule please contact [facility] directly at [phone].  Normal or non-critical lab and imaging results will be communicated to you by MyChart, letter or phone within 4 business days after the clinic has received the results. If you do not hear  from us within 7 days, please contact the clinic through [employer] or phone. If you have a critical or abnormal lab result, we will notify you by phone as soon as possible.  Submit refill requests through [employer] or call your pharmacy and they will forward the refill request to us. Please allow 3 business days for your refill to be completed.          Additional Information About Your Visit        Care EveryWhere ID     This is your Care EveryWhere ID. This could be used by other organizations to access your [place] medical records  [id number]         Blood Pressure from Last 3 Encounters:   09/21/17 110/80   02/15/17 118/80   08/09/16 106/70    Weight from Last 3 Encounters:   01/11/18 89.4 kg (197 lb)   09/21/17 89.4 kg (197 lb)   02/15/17 92.8 kg (204 lb 9.6 oz)              Today, you had the following     No orders found for display       Primary Care Provider Office Phone # Fax #    Mian Hunt [phone]       [address] [place] [address]        Equal Access to Services     [facility]: Hadii aad ku hadasho Socynthiaali, waaxda luqadaha, qaybta kaalmada toby, randal martinez . So [facility] [phone].    ATENCIÓN: Si habla español, tiene a singh disposición servicios gratuitos de asistencia lingüística. [facility] [phone].    We comply with applicable federal civil rights laws and [place] laws. We do not discriminate on the basis of race, color, national origin, age, disability, sex, sexual orientation, or gender identity.            Thank you!     Thank you for choosing [facility]  for your care. Our goal is always to provide you with excellent care. Hearing back from our patients is one way we can continue to improve our services. Please take a few minutes to complete the written survey that you may receive in the mail after your visit with us. Thank you!             Your Updated Medication List - Protect others around  you: Learn how to safely use, store and throw away your medicines at www.disposemymeds.org.          This list is accurate as of 2/6/18  4:42 PM.  Always use your most recent med list.                   Brand Name Dispense Instructions for use Diagnosis    acyclovir 400 MG tablet    ZOVIRAX    180 tablet    Take 1 tablet (400 mg) by mouth 2 times daily    Herpes simplex infection of genitourinary system       imiquimod 5 % cream    ALDARA    12 packet    Apply a small sized amount to warts three times weekly at bedtime.   Wash off after 8 hours.   May use for up to 16 weeks.    Genital warts       indomethacin 25 MG capsule    INDOCIN    42 capsule    Take 1 capsule (25 mg) by mouth 3 times daily (with meals)    Right foot pain

## 2018-02-06 NOTE — PROGRESS NOTES
[facility] Dermatology Note      Dermatology Problem List:  1. Genital Warts  -s/p imiquimod with no results  -s/p cryotherapy with PCP with no results  -negative RPR and HIV  2. HSV positive per patient    Encounter Date: Feb 6, 2018    CC:  Chief Complaint   Patient presents with     Wart     genital improvement with cryo         History of Present Illness:  Mr. Sang Koroma is a 42 year old male who presents for evaluation of genital warts.  He has responded to cryo which he was last seen for on 1/11/2018. He had some blistering and discomfort for 2 days but overall pain was tolerable.     He has no other lesions of concern today.    Past Medical History:   Patient Active Problem List   Diagnosis     Major depressive disorder in full remission (H)     Medial tibial stress syndrome     Elevated serum creatinine     Gout involving toe of right foot     Herpes simplex infection of genitourinary system     Nonintractable episodic headache     No past medical history on file.  No past surgical history on file.    Social History:  The patient works as an . The patient denies use of tanning beds.    Family History:  There is no family history of skin cancer.    Medications:  Current Outpatient Prescriptions   Medication Sig Dispense Refill     acyclovir (ZOVIRAX) 400 MG tablet Take 1 tablet (400 mg) by mouth 2 times daily 180 tablet 3     indomethacin (INDOCIN) 25 MG capsule Take 1 capsule (25 mg) by mouth 3 times daily (with meals) 42 capsule 1     imiquimod (ALDARA) 5 % cream Apply a small sized amount to warts three times weekly at bedtime.   Wash off after 8 hours.   May use for up to 16 weeks. (Patient not taking: Reported on 1/11/2018) 12 packet 3       No Known Allergies    Review of Systems:  -Constitutional: The patient denies feels well otherwise today  -Skin: As above in HPI. No additional skin concerns.    Physical exam:  Vitals: There were no vitals taken for this  visit.  GEN: This is a well developed, well-nourished male in no acute distress, in a pleasant mood.    SKIN: Focused examination of the genital area was performed. Scribe was present.  - There are skin colored flat papules 1-3mm scattered on the base of the penis, mons pubis, and perineum .  -No other lesions of concern on areas examined.     Component      Latest Ref Rng & Units 1/11/2018   Treponema pallidum Antibody      NEG:Negative Negative   HIV Antigen Antibody Combo      NR:Nonreactive     Nonreactive       Impression/Plan:  1. Genital Warts - Scattered in the right scrotum & right perineum and left base of shaft and shaft -responded to cryo     Cryotherapy procedure note: After verbal consent and discussion of risks and benefits including but no limited to dyspigmentation/scar, blister, and pain, 25 were treated with 1-2mm freeze border for 2 cycles with liquid nitrogen. Post cryotherapy instructions were provided.     Avoid shaving    Recommend he inform partner    Follow-up in 3-4 weeks, earlier for new or changing lesions.     Staff Involved:  Scribe/Staff    Scribe Disclosure:   I, Sadia Nguyen, am serving as a scribe to document services personally performed by Dr. Shasha Simental, based on data collection and the provider's statements to me.       Provider Disclosure:   The documentation recorded by the scribe accurately reflects the services I personally performed and the decisions made by me.    Shasha Simental MD    Department of Dermatology  [facility]: Phone: [phone], Fax:[phone]  [facility]: Phone: [phone], Fax: [phone]

## 2018-02-06 NOTE — NURSING NOTE
Dermatology Rooming Note    Sang Koroma's goals for this visit include:   Chief Complaint   Patient presents with     Wart     genital improvement with cryo       Is a scribe okay for this visit:YES    Are records needed for this visit(If yes, obtain release of information): No     Vitals: There were no vitals taken for this visit.    Referring Provider:  No referring provider defined for this encounter.    Jeny Wilson LPN

## 2018-02-06 NOTE — LETTER
2/6/2018         RE: Sang Koroma  [address] [facility] [address]        Dear Colleague,    Thank you for referring your patient, Sang Krooma, to the [facility]. Please see a copy of my visit note below.    [facility] Dermatology Note      Dermatology Problem List:  1. Genital Warts  -s/p imiquimod with no results  -s/p cryotherapy with PCP with no results  -negative RPR and HIV  2. HSV positive per patient    Encounter Date: Feb 6, 2018    CC:  Chief Complaint   Patient presents with     Wart     genital improvement with cryo         History of Present Illness:  Mr. Sang Koroma is a 42 year old male who presents for evaluation of genital warts.  He has responded to cryo which he was last seen for on 1/11/2018. He had some blistering and discomfort for 2 days but overall pain was tolerable.     He has no other lesions of concern today.    Past Medical History:   Patient Active Problem List   Diagnosis     Major depressive disorder in full remission (H)     Medial tibial stress syndrome     Elevated serum creatinine     Gout involving toe of right foot     Herpes simplex infection of genitourinary system     Nonintractable episodic headache     No past medical history on file.  No past surgical history on file.    Social History:  The patient works as an . The patient denies use of tanning beds.    Family History:  There is no family history of skin cancer.    Medications:  Current Outpatient Prescriptions   Medication Sig Dispense Refill     acyclovir (ZOVIRAX) 400 MG tablet Take 1 tablet (400 mg) by mouth 2 times daily 180 tablet 3     indomethacin (INDOCIN) 25 MG capsule Take 1 capsule (25 mg) by mouth 3 times daily (with meals) 42 capsule 1     imiquimod (ALDARA) 5 % cream Apply a small sized amount to warts three times weekly at bedtime.   Wash off after 8 hours.   May use for up to 16 weeks. (Patient not taking: Reported  on 1/11/2018) 12 packet 3       No Known Allergies    Review of Systems:  -Constitutional: The patient denies feels well otherwise today  -Skin: As above in HPI. No additional skin concerns.    Physical exam:  Vitals: There were no vitals taken for this visit.  GEN: This is a well developed, well-nourished male in no acute distress, in a pleasant mood.    SKIN: Focused examination of the genital area was performed. Scribe was present.  - There are skin colored flat papules 1-3mm scattered on the base of the penis, mons pubis, and perineum .  -No other lesions of concern on areas examined.     Component      Latest Ref Rng & Units 1/11/2018   Treponema pallidum Antibody      NEG:Negative Negative   HIV Antigen Antibody Combo      NR:Nonreactive     Nonreactive       Impression/Plan:  1. Genital Warts - Scattered in the right scrotum & right perineum and left base of shaft and shaft -responded to cryo     Cryotherapy procedure note: After verbal consent and discussion of risks and benefits including but no limited to dyspigmentation/scar, blister, and pain, 25 were treated with 1-2mm freeze border for 2 cycles with liquid nitrogen. Post cryotherapy instructions were provided.     Avoid shaving    Recommend he inform partner    Follow-up in 3-4 weeks, earlier for new or changing lesions.     Staff Involved:  Scribe/Staff    Scribe Disclosure:   I, Sadia Nguyen, am serving as a scribe to document services personally performed by Dr. Shasha Simental, based on data collection and the provider's statements to me.       Provider Disclosure:   The documentation recorded by the scribe accurately reflects the services I personally performed and the decisions made by me.    Shasha Simental MD    Department of Dermatology  [facility]: Phone: [phone], Fax:[phone]  [facility]: Phone: [phone],  Fax: [phone]        Again, thank you for allowing me to participate in the care of your patient.        Sincerely,        Shasha Simental MD

## 2018-02-06 NOTE — PATIENT INSTRUCTIONS
CRYOTHERAPY    What is it?    Use of a very cold liquid, such as liquid nitrogen, to freeze and destroy abnormal skin cells that need to be removed    What should I expect?    Tenderness and redness    A small blister that might grow and fill with dark purple blood. There may be crusting.    More than one treatment may be needed if the lesions do not go away.    How do I care for the treated area?    Gently wash the area with your hands when bathing.    Use a thin layer of Vaselin to help with healing. You may use a Band-Aid.     The area should heal within 7-10 days.    Do not use an antibiotic or Neosporin ointment.     You may take acetaminophen (Tylenol) for pain.     Call your Doctor if you have:    Severe pain    Signs of infection (warmth, redness, cloudy yellow drainage, and or a bad smell)    Questions or concerns    Contact infromation:  [facility] [phone]  [facility] [phone]

## 2018-03-06 ENCOUNTER — OFFICE VISIT (OUTPATIENT)
Dept: DERMATOLOGY | Facility: CLINIC | Age: 43
End: 2018-03-06
Payer: COMMERCIAL

## 2018-03-06 DIAGNOSIS — A63.0 GENITAL WARTS: Primary | ICD-10-CM

## 2018-03-06 PROCEDURE — 54056 CRYOSURGERY PENIS LESION(S): CPT | Performed by: DERMATOLOGY

## 2018-03-06 ASSESSMENT — PAIN SCALES - GENERAL: PAINLEVEL: NO PAIN (0)

## 2018-03-06 NOTE — PATIENT INSTRUCTIONS
CRYOTHERAPY    What is it?    Use of a very cold liquid, such as liquid nitrogen, to freeze and destroy abnormal skin cells that need to be removed    What should I expect?    Tenderness and redness    A small blister that might grow and fill with dark purple blood. There may be crusting.    More than one treatment may be needed if the lesions do not go away.    How do I care for the treated area?    Gently wash the area with your hands when bathing.    Use a thin layer of Vaselin to help with healing. You may use a Band-Aid.     The area should heal within 7-10 days.    Do not use an antibiotic or Neosporin ointment.     You may take acetaminophen (Tylenol) for pain.     Call your Doctor if you have:    Severe pain    Signs of infection (warmth, redness, cloudy yellow drainage, and or a bad smell)    Questions or concerns    Contact infromation:  [facility] [phone]  [facility] [phone]

## 2018-03-06 NOTE — NURSING NOTE
Dermatology Rooming Note    Sang Koroma's goals for this visit include:   Chief Complaint   Patient presents with     Wart     genitals slight improvement       Is a scribe okay for this visit:YES    Are records needed for this visit(If yes, obtain release of information): No     Vitals: There were no vitals taken for this visit.    Referring Provider:  No referring provider defined for this encounter.    Jeny Scales LPN

## 2018-03-06 NOTE — MR AVS SNAPSHOT
After Visit Summary   3/6/2018    Sang Koroma    MRN: [id number]           Patient Information     Date Of Birth          1975        Visit Information        Provider Department      3/6/2018 4:15 PM Shasha Simental MD [facility]        Today's Diagnoses     Genital warts    -  1      Care Instructions    CRYOTHERAPY    What is it?    Use of a very cold liquid, such as liquid nitrogen, to freeze and destroy abnormal skin cells that need to be removed    What should I expect?    Tenderness and redness    A small blister that might grow and fill with dark purple blood. There may be crusting.    More than one treatment may be needed if the lesions do not go away.    How do I care for the treated area?    Gently wash the area with your hands when bathing.    Use a thin layer of Vaselin to help with healing. You may use a Band-Aid.     The area should heal within 7-10 days.    Do not use an antibiotic or Neosporin ointment.     You may take acetaminophen (Tylenol) for pain.     Call your Doctor if you have:    Severe pain    Signs of infection (warmth, redness, cloudy yellow drainage, and or a bad smell)    Questions or concerns    Contact infromation:  [facility] [phone]  [facility] [phone]            Follow-ups after your visit        Follow-up notes from your care team     Return in about 2 weeks (around 3/20/2018).      Who to contact     If you have questions or need follow up information about today's clinic visit or your schedule please contact [facility] directly at [phone].  Normal or non-critical lab and imaging results will be communicated to you by MyChart, letter or phone within 4 business days after the clinic has received the results. If you do not hear from us within 7 days, please contact the clinic through MyChart or phone. If you have a critical or abnormal  lab result, we will notify you by phone as soon as possible.  Submit refill requests through [employer] or call your pharmacy and they will forward the refill request to us. Please allow 3 business days for your refill to be completed.          Additional Information About Your Visit        Care EveryWhere ID     This is your Care EveryWhere ID. This could be used by other organizations to access your [place] medical records  [id number]         Blood Pressure from Last 3 Encounters:   09/21/17 110/80   02/15/17 118/80   08/09/16 106/70    Weight from Last 3 Encounters:   01/11/18 89.4 kg (197 lb)   09/21/17 89.4 kg (197 lb)   02/15/17 92.8 kg (204 lb 9.6 oz)              We Performed the Following     DESTRUCT BENIGN LESION, 15 OR MORE        Primary Care Provider Office Phone # Fax #    Mian Hunt [phone]       [address] [facility] [address]        Equal Access to Services     [facility]: Hadii aad ku hadasho Soomaali, waaxda luqadaha, qaybta kaalmada adeegyada, waxay idiin hayaan zina martinez . So [facility] [phone].    ATENCIÓN: Si habla español, tiene a singh disposición servicios gratuitos de asistencia lingüística. [facility] [phone].    We comply with applicable federal civil rights laws and [place] laws. We do not discriminate on the basis of race, color, national origin, age, disability, sex, sexual orientation, or gender identity.            Thank you!     Thank you for choosing [facility]  for your care. Our goal is always to provide you with excellent care. Hearing back from our patients is one way we can continue to improve our services. Please take a few minutes to complete the written survey that you may receive in the mail after your visit with us. Thank you!             Your Updated Medication List - Protect others around you: Learn how to safely use, store and throw away your medicines at www.disposemymeds.org.          This  list is accurate as of 3/6/18  4:29 PM.  Always use your most recent med list.                   Brand Name Dispense Instructions for use Diagnosis    acyclovir 400 MG tablet    ZOVIRAX    180 tablet    Take 1 tablet (400 mg) by mouth 2 times daily    Herpes simplex infection of genitourinary system       indomethacin 25 MG capsule    INDOCIN    42 capsule    Take 1 capsule (25 mg) by mouth 3 times daily (with meals)    Right foot pain

## 2018-03-06 NOTE — PROGRESS NOTES
[facility] Dermatology Note      Dermatology Problem List:  1. Genital Warts  -s/p imiquimod with no results  -s/p cryotherapy with PCP with no results  -negative RPR and HIV  2. HSV positive per patient    Encounter Date: Mar 6, 2018    CC:  Chief Complaint   Patient presents with     Wart     genitals slight improvement         History of Present Illness:  Mr. Sang Koroma is a 42 year old male who presents for evaluation of genital warts.  He has responded to cryo which he was last seen for on 2/6/2018 when the patient was treated with cryo on 25 warts. The patient reports that these are all doing better.He is not shaving, only using clippers. He also notes that there are others on the base of shaft that have been new.     He has no other lesions of concern today.    Past Medical History:   Patient Active Problem List   Diagnosis     Major depressive disorder in full remission (H)     Medial tibial stress syndrome     Elevated serum creatinine     Gout involving toe of right foot     Herpes simplex infection of genitourinary system     Nonintractable episodic headache     No past medical history on file.  No past surgical history on file.    Social History:  The patient works as an . The patient denies use of tanning beds.    Family History:  There is no family history of skin cancer.    Medications:  Current Outpatient Prescriptions   Medication Sig Dispense Refill     acyclovir (ZOVIRAX) 400 MG tablet Take 1 tablet (400 mg) by mouth 2 times daily 180 tablet 3     indomethacin (INDOCIN) 25 MG capsule Take 1 capsule (25 mg) by mouth 3 times daily (with meals) 42 capsule 1       No Known Allergies    Review of Systems:  -Constitutional: The patient denies feels well otherwise today  -Skin: As above in HPI. No additional skin concerns.    Physical exam:  Vitals: There were no vitals taken for this visit.  GEN: This is a well developed, well-nourished male in no acute distress,  in a pleasant mood.    SKIN: Focused examination of the genital area was performed. Scribe was present.  - There are skin colored flat papules 1-3mm scattered on the perineum, the scrotum, perineum (13), shaft, and base of shaft. Flatter and thinner then last visit.  -No other lesions of concern on areas examined.       Impression/Plan:  1. Genital Warts - Scattered in the right scrotum & right perineum and base of shaft and shaft     Cryotherapy procedure note: After verbal consent and discussion of risks and benefits including but no limited to dyspigmentation/scar, blister, and pain, 25 were treated with 1-2mm freeze border for 2 cycles with liquid nitrogen. Post cryotherapy instructions were provided.     Follow-up in 2 weeks, earlier for new or changing lesions.     Staff Involved:  Scribe/Staff    Scribe Disclosure:   I, Sadia Nguyen, am serving as a scribe to document services personally performed by Dr. Shasha Simental, based on data collection and the provider's statements to me.       Provider Disclosure:   The documentation recorded by the scribe accurately reflects the services I personally performed and the decisions made by me.    Shasha Simental MD    Department of Dermatology  [facility]: Phone: [phone], Fax:[phone]  [facility]: Phone: [phone], Fax: [phone]

## 2018-03-19 ENCOUNTER — TELEPHONE (OUTPATIENT)
Dept: DERMATOLOGY | Facility: CLINIC | Age: 43
End: 2018-03-19

## 2018-03-19 NOTE — TELEPHONE ENCOUNTER
[facility] Call Center    Phone Message    May a detailed message be left on voicemail: yes    Reason for Call: Other: Patient has a return appt for 3.22.18 and needs to change it.  He is asking for consideration to reschedule at beginning of April.  Please call patient.      Action Taken: Message routed to:  Adult Clinics: Dermatology p [id number]

## 2018-04-10 ENCOUNTER — OFFICE VISIT (OUTPATIENT)
Dept: DERMATOLOGY | Facility: CLINIC | Age: 43
End: 2018-04-10
Payer: COMMERCIAL

## 2018-04-10 DIAGNOSIS — A63.0 CONDYLOMA ACUMINATUM: Primary | ICD-10-CM

## 2018-04-10 PROCEDURE — 54056 CRYOSURGERY PENIS LESION(S): CPT | Performed by: DERMATOLOGY

## 2018-04-10 ASSESSMENT — PAIN SCALES - GENERAL: PAINLEVEL: NO PAIN (0)

## 2018-04-10 NOTE — NURSING NOTE
Dermatology Rooming Note    Sang Koroma's goals for this visit include:   Chief Complaint   Patient presents with     Wart     Cryotherapy to genital warts.  Patient has noticed improvement.       Is a scribe okay for this visit:YES    Are records needed for this visit(If yes, obtain release of information): No     Vitals: There were no vitals taken for this visit.    Referring Provider:  No referring provider defined for this encounter.    Ana Rosa Calvert RN

## 2018-04-10 NOTE — MR AVS SNAPSHOT
After Visit Summary   4/10/2018    Sang Koroma    MRN: [id number]           Patient Information     Date Of Birth          1975        Visit Information        Provider Department      4/10/2018 4:30 PM Shasha Simental MD [facility]        Today's Diagnoses     Condyloma acuminatum    -  1      Care Instructions    CRYOTHERAPY    What is it?    Use of a very cold liquid, such as liquid nitrogen, to freeze and destroy abnormal skin cells that need to be removed    What should I expect?    Tenderness and redness    A small blister that might grow and fill with dark purple blood. There may be crusting.    More than one treatment may be needed if the lesions do not go away.    How do I care for the treated area?    Gently wash the area with your hands when bathing.    Use a thin layer of Vaselin to help with healing. You may use a Band-Aid.     The area should heal within 7-10 days.    Do not use an antibiotic or Neosporin ointment.     You may take acetaminophen (Tylenol) for pain.     Call your Doctor if you have:    Severe pain    Signs of infection (warmth, redness, cloudy yellow drainage, and or a bad smell)    Questions or concerns    Contact infromation:  [facility] [phone]  [facility] [phone]            Follow-ups after your visit        Your next 10 appointments already scheduled     May 24, 2018  4:30 PM CDT   Return Visit with Shasha Simental MD   [facility]    [address] [place] [address] [phone]              Who to contact     If you have questions or need follow up information about today's clinic visit or your schedule please contact [facility] directly at [phone].  Normal or non-critical lab and imaging results will be communicated to you by MyChart, letter or phone within 4 business days  after the clinic has received the results. If you do not hear from us within 7 days, please contact the clinic through medineeringt or phone. If you have a critical or abnormal lab result, we will notify you by phone as soon as possible.  Submit refill requests through [employer] or call your pharmacy and they will forward the refill request to us. Please allow 3 business days for your refill to be completed.          Additional Information About Your Visit        Care EveryWhere ID     This is your Care EveryWhere ID. This could be used by other organizations to access your [place] medical records  [id number]         Blood Pressure from Last 3 Encounters:   09/21/17 110/80   02/15/17 118/80   08/09/16 106/70    Weight from Last 3 Encounters:   01/11/18 89.4 kg (197 lb)   09/21/17 89.4 kg (197 lb)   02/15/17 92.8 kg (204 lb 9.6 oz)              We Performed the Following     DESTRUCT BENIGN LESION, 15 OR MORE        Primary Care Provider Office Phone # Fax #    Mian Hunt [phone]       [address] [place] [id number]        Equal Access to Services     [facility]: Hadii aad ku hadasho Soomaali, waaxda luqadaha, qaybta kaalmada adeegyada, randal martinez . So [facility] [phone].    ATENCIÓN: Si habla español, tiene a singh disposición servicios gratuitos de asistencia lingüística. Llame al [phone].    We comply with applicable federal civil rights laws and [place] laws. We do not discriminate on the basis of race, color, national origin, age, disability, sex, sexual orientation, or gender identity.            Thank you!     Thank you for choosing [facility]  for your care. Our goal is always to provide you with excellent care. Hearing back from our patients is one way we can continue to improve our services. Please take a few minutes to complete the written survey that you may receive in the mail after your visit with us. Thank  you!             Your Updated Medication List - Protect others around you: Learn how to safely use, store and throw away your medicines at www.disposemymeds.org.          This list is accurate as of 4/10/18  6:14 PM.  Always use your most recent med list.                   Brand Name Dispense Instructions for use Diagnosis    acyclovir 400 MG tablet    ZOVIRAX    180 tablet    Take 1 tablet (400 mg) by mouth 2 times daily    Herpes simplex infection of genitourinary system       indomethacin 25 MG capsule    INDOCIN    42 capsule    Take 1 capsule (25 mg) by mouth 3 times daily (with meals)    Right foot pain

## 2018-04-10 NOTE — PATIENT INSTRUCTIONS
CRYOTHERAPY    What is it?    Use of a very cold liquid, such as liquid nitrogen, to freeze and destroy abnormal skin cells that need to be removed    What should I expect?    Tenderness and redness    A small blister that might grow and fill with dark purple blood. There may be crusting.    More than one treatment may be needed if the lesions do not go away.    How do I care for the treated area?    Gently wash the area with your hands when bathing.    Use a thin layer of Vaselin to help with healing. You may use a Band-Aid.     The area should heal within 7-10 days.    Do not use an antibiotic or Neosporin ointment.     You may take acetaminophen (Tylenol) for pain.     Call your Doctor if you have:    Severe pain    Signs of infection (warmth, redness, cloudy yellow drainage, and or a bad smell)    Questions or concerns    Contact infromation:  [facility] [phone]  [facility] [phone]

## 2018-04-10 NOTE — LETTER
4/10/2018         RE: Sang Koroma  [address] [facility] [address]        Dear Colleague,    Thank you for referring your patient, Sang Koroma, to the [facility]. Please see a copy of my visit note below.    [facility] Dermatology Note      Dermatology Problem List:  1. Genital Warts  -s/p imiquimod with no results  -s/p cryotherapy with PCP with no results  -negative RPR and HIV  2. HSV positive per patient    Encounter Date: Apr 10, 2018    CC:  Chief Complaint   Patient presents with     Wart     Cryotherapy to genital warts.  Patient has noticed improvement.         History of Present Illness:  Mr. Sang Koroma is a 42 year old male who presents for evaluation of genital warts.  He has responded to cryo which he was last seen for on 3/6/2018 when the patient had 25 genital warts. The patient reports that the healing process in painful as he is on his feet. He notes the bottom is getting better, but there is a new one.     He has no other lesions of concern today.    Past Medical History:   Patient Active Problem List   Diagnosis     Major depressive disorder in full remission (H)     Medial tibial stress syndrome     Elevated serum creatinine     Gout involving toe of right foot     Herpes simplex infection of genitourinary system     Nonintractable episodic headache     History reviewed. No pertinent past medical history.  History reviewed. No pertinent surgical history.    Social History:  The patient works as an  and he works on his feet. The patient denies use of tanning beds.    Family History:  There is no family history of skin cancer.    Medications:  Current Outpatient Prescriptions   Medication Sig Dispense Refill     acyclovir (ZOVIRAX) 400 MG tablet Take 1 tablet (400 mg) by mouth 2 times daily 180 tablet 3     indomethacin (INDOCIN) 25 MG capsule Take 1 capsule (25 mg) by mouth 3 times daily (with meals)  (Patient not taking: Reported on 4/10/2018) 42 capsule 1       No Known Allergies    Review of Systems:  -Constitutional: The patient denies feels well otherwise today  -Skin: As above in HPI. No additional skin concerns.    Physical exam:  Vitals: There were no vitals taken for this visit.  GEN: This is a well developed, well-nourished male in no acute distress, in a pleasant mood.    SKIN: Focused examination of the genital area was performed. Scribe was present.  - There are skin colored flat papules 1-3mm scattered on the x9 on the right groin, x8 on the mons pubis, x4 on the anterior aspect, x1 on the base of shaft  -No other lesions of concern on areas examined.       Impression/Plan:  1. Genital Warts - Scattered in the right scrotum & right perineum and base of shaft and shaft, improved    Cryotherapy procedure note: After verbal consent and discussion of risks and benefits including but no limited to dyspigmentation/scar, blister, and pain,  22 were treated with 1-2mm freeze border for 2 cycles with liquid nitrogen. Post cryotherapy instructions were provided.     Follow-up in 4 weeks, earlier for new or changing lesions.     Staff Involved:  Scribe/Staff    Scribe Disclosure:   I, Sadia Nguyen, am serving as a scribe to document services personally performed by Dr. Shasha Simental, based on data collection and the provider's statements to me.       Provider Disclosure:   The documentation recorded by the scribe accurately reflects the services I personally performed and the decisions made by me.    Shasha Simental MD    Department of Dermatology  [facility]: Phone: [phone], Fax:[phone]  [facility]: Phone: [phone], Fax: [phone]        Again, thank you for allowing me to participate in the care of your patient.        Sincerely,        Shasha Simental MD

## 2018-04-10 NOTE — PROGRESS NOTES
[facility] Dermatology Note      Dermatology Problem List:  1. Genital Warts  -s/p imiquimod with no results  -s/p cryotherapy with PCP with no results  -negative RPR and HIV  2. HSV positive per patient    Encounter Date: Apr 10, 2018    CC:  Chief Complaint   Patient presents with     Wart     Cryotherapy to genital warts.  Patient has noticed improvement.         History of Present Illness:  Mr. Sang Koroma is a 42 year old male who presents for evaluation of genital warts.  He has responded to cryo which he was last seen for on 3/6/2018 when the patient had 25 genital warts. The patient reports that the healing process in painful as he is on his feet. He notes the bottom is getting better, but there is a new one.     He has no other lesions of concern today.    Past Medical History:   Patient Active Problem List   Diagnosis     Major depressive disorder in full remission (H)     Medial tibial stress syndrome     Elevated serum creatinine     Gout involving toe of right foot     Herpes simplex infection of genitourinary system     Nonintractable episodic headache     History reviewed. No pertinent past medical history.  History reviewed. No pertinent surgical history.    Social History:  The patient works as an  and he works on his feet. The patient denies use of tanning beds.    Family History:  There is no family history of skin cancer.    Medications:  Current Outpatient Prescriptions   Medication Sig Dispense Refill     acyclovir (ZOVIRAX) 400 MG tablet Take 1 tablet (400 mg) by mouth 2 times daily 180 tablet 3     indomethacin (INDOCIN) 25 MG capsule Take 1 capsule (25 mg) by mouth 3 times daily (with meals) (Patient not taking: Reported on 4/10/2018) 42 capsule 1       No Known Allergies    Review of Systems:  -Constitutional: The patient denies feels well otherwise today  -Skin: As above in HPI. No additional skin concerns.    Physical exam:  Vitals: There were no  vitals taken for this visit.  GEN: This is a well developed, well-nourished male in no acute distress, in a pleasant mood.    SKIN: Focused examination of the genital area was performed. Scribe was present.  - There are skin colored flat papules 1-3mm scattered on the x9 on the right groin, x8 on the mons pubis, x4 on the anterior aspect, x1 on the base of shaft  -No other lesions of concern on areas examined.       Impression/Plan:  1. Genital Warts - Scattered in the right scrotum & right perineum and base of shaft and shaft, improved    Cryotherapy procedure note: After verbal consent and discussion of risks and benefits including but no limited to dyspigmentation/scar, blister, and pain,  22 were treated with 1-2mm freeze border for 2 cycles with liquid nitrogen. Post cryotherapy instructions were provided.     Follow-up in 4 weeks, earlier for new or changing lesions.     Staff Involved:  Scribe/Staff    Scribe Disclosure:   I, Sadia Nguyen, am serving as a scribe to document services personally performed by Dr. Shasha Simental, based on data collection and the provider's statements to me.       Provider Disclosure:   The documentation recorded by the scribe accurately reflects the services I personally performed and the decisions made by me.    Shasha Simental MD    Department of Dermatology  [facility]: Phone: [phone], Fax:[phone]  [facility]: Phone: [phone], Fax: [phone]

## 2018-06-12 ENCOUNTER — OFFICE VISIT (OUTPATIENT)
Dept: DERMATOLOGY | Facility: CLINIC | Age: 43
End: 2018-06-12
Payer: COMMERCIAL

## 2018-06-12 DIAGNOSIS — A63.0 CONDYLOMA ACUMINATUM: Primary | ICD-10-CM

## 2018-06-12 PROCEDURE — 17111 DESTRUCTION B9 LESIONS 15/>: CPT | Performed by: DERMATOLOGY

## 2018-06-12 ASSESSMENT — PAIN SCALES - GENERAL: PAINLEVEL: NO PAIN (0)

## 2018-06-12 NOTE — MR AVS SNAPSHOT
After Visit Summary   6/12/2018    Sang Koroma    MRN: [id number]           Patient Information     Date Of Birth          1975        Visit Information        Provider Department      6/12/2018 4:45 PM Shasha Simental MD [facility]        Today's Diagnoses     Condyloma acuminatum    -  1      Care Instructions    CRYOTHERAPY    What is it?    Use of a very cold liquid, such as liquid nitrogen, to freeze and destroy abnormal skin cells that need to be removed    What should I expect?    Tenderness and redness    A small blister that might grow and fill with dark purple blood. There may be crusting.    More than one treatment may be needed if the lesions do not go away.    How do I care for the treated area?    Gently wash the area with your hands when bathing.    Use a thin layer of Vaselin to help with healing. You may use a Band-Aid.     The area should heal within 7-10 days.    Do not use an antibiotic or Neosporin ointment.     You may take acetaminophen (Tylenol) for pain.     Call your Doctor if you have:    Severe pain    Signs of infection (warmth, redness, cloudy yellow drainage, and or a bad smell)    Questions or concerns    Contact infromation:  [facility] [phone]  [facility] [phone]            Follow-ups after your visit        Follow-up notes from your care team     Return in about 8 weeks (around 8/7/2018).      Your next 10 appointments already scheduled     Aug 07, 2018  3:45 PM CDT   [facility]imental [facility]   [facility]    [address] [place] [address] [phone]              Who to contact     If you have questions or need follow up information about today's clinic visit or your schedule please contact [facility] directly at [phone].  Normal or non-critical lab and imaging  results will be communicated to you by MyChart, letter or phone within 4 business days after the clinic has received the results. If you do not hear from us within 7 days, please contact the clinic through MyChart or phone. If you have a critical or abnormal lab result, we will notify you by phone as soon as possible.  Submit refill requests through [employer] or call your pharmacy and they will forward the refill request to us. Please allow 3 business days for your refill to be completed.          Additional Information About Your Visit        Care EveryWhere ID     This is your Care EveryWhere ID. This could be used by other organizations to access your [place] medical records  [id number]         Blood Pressure from Last 3 Encounters:   09/21/17 110/80   02/15/17 118/80   08/09/16 106/70    Weight from Last 3 Encounters:   01/11/18 89.4 kg (197 lb)   09/21/17 89.4 kg (197 lb)   02/15/17 92.8 kg (204 lb 9.6 oz)              We Performed the Following     DESTRUCT BENIGN LESION, 15 OR MORE        Primary Care Provider Office Phone # Fax #    Mian Hunt [phone]       [address] [place] [address]        Equal Access to Services     WANDA CELESTE : Hadii aad ku hadasho Soomaali, waaxda luqadaha, qaybta kaalmada adeegyada, randal martinez . So [facility] [phone].    ATENCIÓN: Si habla español, tiene a singh disposición servicios gratuitos de asistencia lingüística. Llame al [phone].    We comply with applicable federal civil rights laws and [place] laws. We do not discriminate on the basis of race, color, national origin, age, disability, sex, sexual orientation, or gender identity.            Thank you!     Thank you for choosing [facility]  for your care. Our goal is always to provide you with excellent care. Hearing back from our patients is one way we can continue to improve our services. Please take a few minutes to complete  the written survey that you may receive in the mail after your visit with us. Thank you!             Your Updated Medication List - Protect others around you: Learn how to safely use, store and throw away your medicines at www.disposemymeds.org.          This list is accurate as of 6/12/18  5:11 PM.  Always use your most recent med list.                   Brand Name Dispense Instructions for use Diagnosis    acyclovir 400 MG tablet    ZOVIRAX    180 tablet    Take 1 tablet (400 mg) by mouth 2 times daily    Herpes simplex infection of genitourinary system       indomethacin 25 MG capsule    INDOCIN    42 capsule    Take 1 capsule (25 mg) by mouth 3 times daily (with meals)    Right foot pain

## 2018-06-12 NOTE — PROGRESS NOTES
[facility] Dermatology Note      Dermatology Problem List:  1. Genital Warts  -s/p imiquimod with no results  -s/p cryotherapy with PCP with no results  -negative RPR and HIV  2. HSV positive per patient    Encounter Date: Jun 12, 2018    CC:  Chief Complaint   Patient presents with     Wart     genital area         History of Present Illness:  Mr. Sang Koroma is a 42 year old male who presents for evaluation of genital warts.  He has responded to cryo which he was last seen for on 4/10/2018. The patient reports that he has used imiquimod in the past, but this did not improve his warts he was using this x3 weekly. The patient reports that he was scheduled for an appointment several weeks ago which is why he has not followed up in some time. He is doing well despite that.     He has no other lesions of concern today.    Past Medical History:   Patient Active Problem List   Diagnosis     Major depressive disorder in full remission (H)     Medial tibial stress syndrome     Elevated serum creatinine     Gout involving toe of right foot     Herpes simplex infection of genitourinary system     Nonintractable episodic headache     No past medical history on file.  No past surgical history on file.    Social History:  The patient works as an  and he works on his feet. The patient denies use of tanning beds.  Kept in chart for convenience.     Family History:  There is no family history of skin cancer.    Medications:  Current Outpatient Prescriptions   Medication Sig Dispense Refill     acyclovir (ZOVIRAX) 400 MG tablet Take 1 tablet (400 mg) by mouth 2 times daily 180 tablet 3     indomethacin (INDOCIN) 25 MG capsule Take 1 capsule (25 mg) by mouth 3 times daily (with meals) 42 capsule 1       No Known Allergies    Review of Systems:  Not otbtained    Physical exam:  Vitals: There were no vitals taken for this visit.  GEN: This is a well developed, well-nourished male in no acute  distress, in a pleasant mood.    SKIN: Focused examination of the genital area was performed. Scribe was present.  - There are skin colored flat papules 1-3mm scattered on the x5 on the base of the penis, right groin, mons pubis (15 in total). Decreased overall  -No other lesions of concern on areas examined.       Impression/Plan:  1. Genital Warts - Scattered in the right scrotum & right perineum and base of shaft and shaft, improved    Cryotherapy procedure note: After verbal consent and discussion of risks and benefits including but no limited to dyspigmentation/scar, blister, and pain, 15 were treated with 1-2mm freeze border for 2 cycles with liquid nitrogen. Post cryotherapy instructions were provided.     Declines aldara with increased F    Follow-up in 6-8 weeks, earlier for new or changing lesions.     Staff Involved:  Scribe/Staff    Scribe Disclosure:   I, Sadia Nguyen, am serving as a scribe to document services personally performed by Dr. Shasha Simental, based on data collection and the provider's statements to me.       Provider Disclosure:   The documentation recorded by the scribe accurately reflects the services I personally performed and the decisions made by me.    Shasha Simental MD    Department of Dermatology  [facility]: Phone: [phone], Fax:[phone]  [facility]: Phone: [phone], Fax: [phone]

## 2018-06-12 NOTE — PATIENT INSTRUCTIONS
CRYOTHERAPY    What is it?    Use of a very cold liquid, such as liquid nitrogen, to freeze and destroy abnormal skin cells that need to be removed    What should I expect?    Tenderness and redness    A small blister that might grow and fill with dark purple blood. There may be crusting.    More than one treatment may be needed if the lesions do not go away.    How do I care for the treated area?    Gently wash the area with your hands when bathing.    Use a thin layer of Vaselin to help with healing. You may use a Band-Aid.     The area should heal within 7-10 days.    Do not use an antibiotic or Neosporin ointment.     You may take acetaminophen (Tylenol) for pain.     Call your Doctor if you have:    Severe pain    Signs of infection (warmth, redness, cloudy yellow drainage, and or a bad smell)    Questions or concerns    Contact infromation:  [facility] [phone]  [facility] [phone]

## 2018-09-18 ENCOUNTER — OFFICE VISIT (OUTPATIENT)
Dept: DERMATOLOGY | Facility: CLINIC | Age: 43
End: 2018-09-18
Payer: COMMERCIAL

## 2018-09-18 DIAGNOSIS — A63.0 GENITAL WARTS: Primary | ICD-10-CM

## 2018-09-18 PROCEDURE — 54056 CRYOSURGERY PENIS LESION(S): CPT | Performed by: DERMATOLOGY

## 2018-09-18 RX ORDER — FLUOROURACIL 50 MG/G
CREAM TOPICAL
Qty: 40 G | Refills: 1 | Status: SHIPPED | OUTPATIENT
Start: 2018-09-18 | End: 2019-06-28

## 2018-09-18 NOTE — MR AVS SNAPSHOT
After Visit Summary   9/18/2018    Sang Koroma    MRN: [id number]           Patient Information     Date Of Birth          1975        Visit Information        Provider Department      9/18/2018 4:00 PM Shasha Simental MD [facility]        Today's Diagnoses     Genital warts    -  1      Care Instructions    Cryotherapy    What is it?    Use of a very cold liquid, such as liquid nitrogen, to freeze and destroy abnormal skin cells that need to be removed    What should I expect?    Tenderness and redness    A small blister that might grow and fill with dark purple blood. There may be crusting.    More than one treatment may be needed if the lesions do not go away.    How do I care for the treated area?    Gently wash the area with your hands when bathing.    Use a thin layer of Vaseline to help with healing. You may use a Band-Aid.     The area should heal within 7-10 days and may leave behind a pink or lighter color.     Do not use an antibiotic or Neosporin ointment.     You may take acetaminophen (Tylenol) for pain.     Call your Doctor if you have:    Severe pain    Signs of infection (warmth, redness, cloudy yellow drainage, and or a bad smell)    Questions or concerns    Who should I call with questions?       [facility]: [phone]       [facility]: [phone]       For urgent needs outside of business hours call the [facility] at [phone]        and ask for the dermatology resident on call            Follow-ups after your visit        Follow-up notes from your care team     Return in about 4 weeks (around 10/16/2018) for warts.      Your next 10 appointments already scheduled     Nov 01, 2018  4:00 PM CDT   Return Visit with Shasha Simental MD   [facility] ([facility]    [address] [address] [place] [address] [phone]              Who to  contact     If you have questions or need follow up information about today's clinic visit or your schedule please contact [facility] directly at [phone].  Normal or non-critical lab and imaging results will be communicated to you by MyChart, letter or phone within 4 business days after the clinic has received the results. If you do not hear from us within 7 days, please contact the clinic through MyChart or phone. If you have a critical or abnormal lab result, we will notify you by phone as soon as possible.  Submit refill requests through [employer] or call your pharmacy and they will forward the refill request to us. Please allow 3 business days for your refill to be completed.          Additional Information About Your Visit        [employer] Information     [employer] is an electronic gateway that provides easy, online access to your medical records. With [employer], you can request a clinic appointment, read your test results, renew a prescription or communicate with your care team.     To sign up for [employer] visit the website at [email or web address]   You will be asked to enter the access code listed below, as well as some personal information. Please follow the directions to create your username and password.     Your access code is: [email or web address]  Expires: 2018  4:42 PM     Your access code will  in 90 days. If you need help or a new code, please contact your [facility] or call [phone] for assistance.        Care EveryWhere ID     This is your Care EveryWhere ID. This could be used by other organizations to access your [place] medical records  [id number]         Blood Pressure from Last 3 Encounters:   17 110/80   02/15/17 118/80   16 106/70    Weight from Last 3 Encounters:   18 89.4 kg (197 lb)   17 89.4 kg (197 lb)   02/15/17 92.8 kg (204 lb 9.6 oz)              We Performed the Following     DESTRUCT BENIGN  LESION, 15 OR MORE          Today's Medication Changes          These changes are accurate as of 9/18/18  4:45 PM.  If you have any questions, ask your nurse or doctor.               Start taking these medicines.        Dose/Directions    fluorouracil 5 % cream   Commonly known as:  EFUDEX   Used for:  Genital warts        Apply twice daily for 2-3 weeks or until irritation, then stop   Quantity:  40 g   Refills:  1            Where to get your medicines      These [place] [facility] [place] [address] [facility]  [address], [facility] [address]     Phone:  [phone]     fluorouracil 5 % cream                Primary Care Provider Office Phone # Fax #    Mian Hunt [phone]       [address] [facility] [address]        Equal Access to Services     [facility]: Hadii aad ku hadasho Soomaali, waaxda luqadaha, qaybta kaalmada aderupa, randal martinez . So [facility] [phone].    ATENCIÓN: Si habla español, tiene a singh disposición servicios gratuitos de asistencia lingüística. Lorenzo al [phone].    We comply with applicable federal civil rights laws and [place] laws. We do not discriminate on the basis of race, color, national origin, age, disability, sex, sexual orientation, or gender identity.            Thank you!     Thank you for choosing [facility]  for your care. Our goal is always to provide you with excellent care. Hearing back from our patients is one way we can continue to improve our services. Please take a few minutes to complete the written survey that you may receive in the mail after your visit with us. Thank you!             Your Updated Medication List - Protect others around you: Learn how to safely use, store and throw away your medicines at www.disposemymeds.org.          This list is accurate as of 9/18/18  4:45 PM.  Always use your most recent med list.                    Brand Name Dispense Instructions for use Diagnosis    acyclovir 400 MG tablet    ZOVIRAX    180 tablet    Take 1 tablet (400 mg) by mouth 2 times daily    Herpes simplex infection of genitourinary system       fluorouracil 5 % cream    EFUDEX    40 g    Apply twice daily for 2-3 weeks or until irritation, then stop    Genital warts       indomethacin 25 MG capsule    INDOCIN    42 capsule    Take 1 capsule (25 mg) by mouth 3 times daily (with meals)    Right foot pain

## 2018-09-18 NOTE — PATIENT INSTRUCTIONS
Cryotherapy    What is it?    Use of a very cold liquid, such as liquid nitrogen, to freeze and destroy abnormal skin cells that need to be removed    What should I expect?    Tenderness and redness    A small blister that might grow and fill with dark purple blood. There may be crusting.    More than one treatment may be needed if the lesions do not go away.    How do I care for the treated area?    Gently wash the area with your hands when bathing.    Use a thin layer of Vaseline to help with healing. You may use a Band-Aid.     The area should heal within 7-10 days and may leave behind a pink or lighter color.     Do not use an antibiotic or Neosporin ointment.     You may take acetaminophen (Tylenol) for pain.     Call your Doctor if you have:    Severe pain    Signs of infection (warmth, redness, cloudy yellow drainage, and or a bad smell)    Questions or concerns    Who should I call with questions?       [facility]: [phone]       [facility]: [phone]       For urgent needs outside of business hours call the [facility] at [phone]        and ask for the dermatology resident on call

## 2018-09-18 NOTE — NURSING NOTE
Sang Koroma's goals for this visit include:   Chief Complaint   Patient presents with     RECHECK     G. warts cryo no changes        He requests these members of his care team be copied on today's visit information: yes    PCP: Mian Hunt    Referring Provider:  No referring provider defined for this encounter.    There were no vitals taken for this visit.    Do you need any medication refills at today's visit? No     Amorrjosué Meneses CMA

## 2018-09-18 NOTE — PROGRESS NOTES
[facility] Dermatology Note      Dermatology Problem List:  1. Genital Warts  - s/p Aldara with no results  -s/p imiquimod with no results  -s/p cryotherapy with PCP with no results  -negative RPR and HIV  2. HSV positive per patient    Encounter Date: Sep 18, 2018    CC:  Chief Complaint   Patient presents with     RECHECK     G. warts cryo no changes          History of Present Illness:  Mr. Sang Koroma is a 42 year old male who presents for follow up for genital warts. He was last seen 6/12/18 when he had the warts treated with cryo. The patient reports that he might have a few new lesions. He has used Aldara cream in the past, but it did not help at all. Open to other treatment options. He missed last 2 appts    He has no other lesions of concern today.    Past Medical History:   Patient Active Problem List   Diagnosis     Major depressive disorder in full remission (H)     Medial tibial stress syndrome     Elevated serum creatinine     Gout involving toe of right foot     Herpes simplex infection of genitourinary system     Nonintractable episodic headache     No past medical history on file.  No past surgical history on file.    Social History:  The patient works as an  and he works on his feet. The patient denies use of tanning beds.  Kept in chart for convenience.     Family History:  There is no family history of skin cancer.    Medications:  Current Outpatient Prescriptions   Medication Sig Dispense Refill     acyclovir (ZOVIRAX) 400 MG tablet Take 1 tablet (400 mg) by mouth 2 times daily 180 tablet 3     indomethacin (INDOCIN) 25 MG capsule Take 1 capsule (25 mg) by mouth 3 times daily (with meals) 42 capsule 1       No Known Allergies    Review of Systems:  Not otbtained    Physical exam:  Vitals: There were no vitals taken for this visit.  GEN: This is a well developed, well-nourished male in no acute distress, in a pleasant mood.    SKIN: Focused examination of  the genital area was performed. Scribe was present.  - There are skin colored flat papules 1-3mm scattered on the base of the right penis, right scrotum and perineum  -No other lesions of concern on areas examined.       Impression/Plan:  1. Genital Warts - will do cryo, failed aldara, switch to efudex and use only when healed    Cryotherapy procedure note: After verbal consent and discussion of risks and benefits including but no limited to dyspigmentation/scar, blister, and pain, 30 were treated with 1-2mm freeze border for 2 cycles with liquid nitrogen. Post cryotherapy instructions were provided.   Start Efudex twice daily for 2-3 weeks or until the onset of irritation. Discussed that we would expect irritation form this medications. Recommend the patient wash hands after use or use gloves to apply. Keep medication away from pets.   Do not occlude treated area with bandages. Follow up 4 weeks after the last application.     Follow-up in 4-6 weeks, earlier for new or changing lesions.     Staff Involved:  Scribe/Staff    Scribe Disclosure  I, Mitchell Conway, am serving as a scribe to document services personally performed by Dr. Shasha Simental MD, based on data collection and the provider's statements to me.     Provider Disclosure:   The documentation recorded by the scribe accurately reflects the services I personally performed and the decisions made by me.    Shasha Simental MD    Department of Dermatology  [facility]: Phone: [phone], Fax:[phone]  [facility]: Phone: [phone], Fax: [phone]

## 2018-09-18 NOTE — LETTER
9/18/2018         RE: Sang Koroma  [address] [facility] [address]        Dear Colleague,    Thank you for referring your patient, Sang Koroma, to the [facility]. Please see a copy of my visit note below.    [facility] Dermatology Note      Dermatology Problem List:  1. Genital Warts  - s/p Aldara with no results  -s/p imiquimod with no results  -s/p cryotherapy with PCP with no results  -negative RPR and HIV  2. HSV positive per patient    Encounter Date: Sep 18, 2018    CC:  Chief Complaint   Patient presents with     RECHECK     G. warts cryo no changes          History of Present Illness:  Mr. Sang Koroma is a 42 year old male who presents for follow up for genital warts. He was last seen 6/12/18 when he had the warts treated with cryo. The patient reports that he might have a few new lesions. He has used Aldara cream in the past, but it did not help at all. Open to other treatment options. He missed last 2 appts    He has no other lesions of concern today.    Past Medical History:   Patient Active Problem List   Diagnosis     Major depressive disorder in full remission (H)     Medial tibial stress syndrome     Elevated serum creatinine     Gout involving toe of right foot     Herpes simplex infection of genitourinary system     Nonintractable episodic headache     No past medical history on file.  No past surgical history on file.    Social History:  The patient works as an  and he works on his feet. The patient denies use of tanning beds.  Kept in chart for convenience.     Family History:  There is no family history of skin cancer.    Medications:  Current Outpatient Prescriptions   Medication Sig Dispense Refill     acyclovir (ZOVIRAX) 400 MG tablet Take 1 tablet (400 mg) by mouth 2 times daily 180 tablet 3     indomethacin (INDOCIN) 25 MG capsule Take 1 capsule (25 mg) by mouth 3 times daily (with meals) 42 capsule 1        No Known Allergies    Review of Systems:  Not otbtained    Physical exam:  Vitals: There were no vitals taken for this visit.  GEN: This is a well developed, well-nourished male in no acute distress, in a pleasant mood.    SKIN: Focused examination of the genital area was performed. Scribe was present.  - There are skin colored flat papules 1-3mm scattered on the base of the right penis, right scrotum and perineum  -No other lesions of concern on areas examined.       Impression/Plan:  1. Genital Warts - will do cryo, failed aldara, switch to efudex and use only when healed    Cryotherapy procedure note: After verbal consent and discussion of risks and benefits including but no limited to dyspigmentation/scar, blister, and pain, 30 were treated with 1-2mm freeze border for 2 cycles with liquid nitrogen. Post cryotherapy instructions were provided.   Start Efudex twice daily for 2-3 weeks or until the onset of irritation. Discussed that we would expect irritation form this medications. Recommend the patient wash hands after use or use gloves to apply. Keep medication away from pets.   Do not occlude treated area with bandages. Follow up 4 weeks after the last application.     Follow-up in 4-6 weeks, earlier for new or changing lesions.     Staff Involved:  Scribe/Staff    Scribe Disclosure  I, Mitchell Conway, am serving as a scribe to document services personally performed by Dr. Shasha Simental MD, based on data collection and the provider's statements to me.     Provider Disclosure:   The documentation recorded by the scribe accurately reflects the services I personally performed and the decisions made by me.    Shasha Simental MD    Department of Dermatology  [facility]: Phone: [phone], Fax:[phone]  [facility]: Phone: [phone], Fax: [phone]          Again, thank you for  allowing me to participate in the care of your patient.        Sincerely,        Shasha Simental MD

## 2018-10-31 ENCOUNTER — TELEPHONE (OUTPATIENT)
Dept: DERMATOLOGY | Facility: CLINIC | Age: 43
End: 2018-10-31

## 2018-10-31 NOTE — TELEPHONE ENCOUNTER
[facility] Call Center    Phone Message    May a detailed message be left on voicemail: yes    Reason for Call: Patient called needing to reschedule his appointment currently scheduled for tomorrow at 4 pm. Requesting a call after 3PM tomorrow to reschedule.Thank you    Action Taken: Message routed to:  Adult Clinics: Dermatology p [id number]

## 2018-11-01 NOTE — TELEPHONE ENCOUNTER
Pt called, no answer. Left message for pt to call the [facility] clinic back at [phone]....Vaishali Aguila RN

## 2018-11-07 NOTE — TELEPHONE ENCOUNTER
Pt called to see if he would like to reschedule appt.  no answer.  Left message for pt to call the [facility] clinic back at [phone]....Vaishali Aguila RN

## 2018-11-09 NOTE — TELEPHONE ENCOUNTER
I left a message for patient to call [facility].  Three attempts to reach patient.  Closing encounter.  Ana Rosa Calvert RN

## 2019-02-14 ENCOUNTER — OFFICE VISIT (OUTPATIENT)
Dept: DERMATOLOGY | Facility: CLINIC | Age: 44
End: 2019-02-14
Payer: COMMERCIAL

## 2019-02-14 DIAGNOSIS — A63.0 GENITAL WARTS: Primary | ICD-10-CM

## 2019-02-14 PROCEDURE — 17111 DESTRUCTION B9 LESIONS 15/>: CPT | Performed by: DERMATOLOGY

## 2019-02-14 ASSESSMENT — PAIN SCALES - GENERAL: PAINLEVEL: NO PAIN (0)

## 2019-02-14 NOTE — NURSING NOTE
Sang Koroma's goals for this visit include:   Chief Complaint   Patient presents with     Derm Problem     Dmitry is returning to discuss gen warts; topical was not effective       He requests these members of his care team be copied on today's visit information:     PCP: Mian Hunt    Referring Provider:  No referring provider defined for this encounter.    There were no vitals taken for this visit.    Do you need any medication refills at today's visit? No  Chloe Yadav LPN

## 2019-02-14 NOTE — LETTER
2/14/2019         RE: Sang Koroma  [address] [place] [facility] [address]        Dear Colleague,    Thank you for referring your patient, Sang Koroma, to the [facility]. Please see a copy of my visit note below.    [facility] Dermatology Note      Dermatology Problem List:  1. Genital Warts  - s/p Aldara with no results  -s/p imiquimod with no results  -s/p cryotherapy with PCP with no results  -negative RPR and HIV  2. HSV positive per patient    Encounter Date: Feb 14, 2019    CC:  Chief Complaint   Patient presents with     Derm Problem     Dmitry is returning to discuss gen warts; topical was not effective         History of Present Illness:  MrJulian Koroma is a 43 year old male who presents for follow up for genital warts. He was last seen 9/18/18 when he had cryotherapy and started Efudex. Today the pt reports that Efudex was not helpful. The warts seemed to respond to cryotherapy, however. No other concerns to address.     Past Medical History:   Patient Active Problem List   Diagnosis     Major depressive disorder in full remission (H)     Medial tibial stress syndrome     Elevated serum creatinine     Gout involving toe of right foot     Herpes simplex infection of genitourinary system     Nonintractable episodic headache     No past medical history on file.  No past surgical history on file.    Social History:  The patient works as an  and he works on his feet. The patient denies use of tanning beds.  Kept in chart for convenience.     Family History:  There is no family history of skin cancer.    Medications:  Current Outpatient Medications   Medication Sig Dispense Refill     acyclovir (ZOVIRAX) 400 MG tablet Take 1 tablet (400 mg) by mouth 2 times daily 180 tablet 3     fluorouracil (EFUDEX) 5 % cream Apply twice daily for 2-3 weeks or until irritation, then stop (Patient not taking: Reported on 2/14/2019) 40 g 1      indomethacin (INDOCIN) 25 MG capsule Take 1 capsule (25 mg) by mouth 3 times daily (with meals) (Patient not taking: Reported on 2/14/2019) 42 capsule 1       No Known Allergies    Review of Systems:  Not otbtained    Physical exam:  Vitals: There were no vitals taken for this visit.  GEN: This is a well developed, well-nourished male in no acute distress, in a pleasant mood.    SKIN: Focused examination of the genital area was performed. Scribe was present.  - Verrucous papules of the base of the penis, along the mons pubis, on the shaft. 2-3mm in size, some coalescing  -No other lesions of concern on areas examined.       Impression/Plan:  1. Genital Warts - will do cryo, failed aldara, now Efudex.     Cryotherapy procedure note: After verbal consent and discussion of risks and benefits including but no limited to   blister, and pain, 21 were treated with 1-2mm freeze border for 2 cycles with liquid nitrogen. Post cryotherapy instructions were provided.     Will consider podophyllin at next visit.     Follow-up in 2 weeks.      Staff Involved:  Scribe/Staff    Scribe Disclosure  I, Mitchell Conway, am serving as a scribe to document services personally performed by Dr. Shasha Simental MD, based on data collection and the provider's statements to me.     Provider Disclosure:   The documentation recorded by the scribe accurately reflects the services I personally performed and the decisions made by me.    Shasha Simental MD    Department of Dermatology  [facility]: Phone: [phone], Fax:[phone]  [facility]: Phone: [phone], Fax: [phone]          Again, thank you for allowing me to participate in the care of your patient.        Sincerely,        Shasha Simental MD

## 2019-02-14 NOTE — PATIENT INSTRUCTIONS
Cryotherapy    What is it?    Use of a very cold liquid, such as liquid nitrogen, to freeze and destroy abnormal skin cells that need to be removed    What should I expect?    Tenderness and redness    A small blister that might grow and fill with dark purple blood. There may be crusting.    More than one treatment may be needed if the lesions do not go away.    How do I care for the treated area?    Gently wash the area with your hands when bathing.    Use a thin layer of Vaseline to help with healing. You may use a Band-Aid.     The area should heal within 7-10 days and may leave behind a pink or lighter color.     Do not use an antibiotic or Neosporin ointment.     You may take acetaminophen (Tylenol) for pain.     Call your Doctor if you have:    Severe pain    Signs of infection (warmth, redness, cloudy yellow drainage, and or a bad smell)    Questions or concerns    Who should I call with questions?       [facility]: [phone]       [facility]: [phone]       For urgent needs outside of business hours call the [facility] at [phone]        and ask for the dermatology resident on call

## 2019-02-14 NOTE — PROGRESS NOTES
[facility] Dermatology Note      Dermatology Problem List:  1. Genital Warts  - s/p Aldara with no results  -s/p imiquimod with no results  -s/p cryotherapy with PCP with no results  -negative RPR and HIV  2. HSV positive per patient    Encounter Date: Feb 14, 2019    CC:  Chief Complaint   Patient presents with     Derm Problem     Dmitry is returning to discuss gen warts; topical was not effective         History of Present Illness:  Mr. Sang Koroma is a 43 year old male who presents for follow up for genital warts. He was last seen 9/18/18 when he had cryotherapy and started Efudex. Today the pt reports that Efudex was not helpful. The warts seemed to respond to cryotherapy, however. No other concerns to address.     Past Medical History:   Patient Active Problem List   Diagnosis     Major depressive disorder in full remission (H)     Medial tibial stress syndrome     Elevated serum creatinine     Gout involving toe of right foot     Herpes simplex infection of genitourinary system     Nonintractable episodic headache     No past medical history on file.  No past surgical history on file.    Social History:  The patient works as an  and he works on his feet. The patient denies use of tanning beds.  Kept in chart for convenience.     Family History:  There is no family history of skin cancer.    Medications:  Current Outpatient Medications   Medication Sig Dispense Refill     acyclovir (ZOVIRAX) 400 MG tablet Take 1 tablet (400 mg) by mouth 2 times daily 180 tablet 3     fluorouracil (EFUDEX) 5 % cream Apply twice daily for 2-3 weeks or until irritation, then stop (Patient not taking: Reported on 2/14/2019) 40 g 1     indomethacin (INDOCIN) 25 MG capsule Take 1 capsule (25 mg) by mouth 3 times daily (with meals) (Patient not taking: Reported on 2/14/2019) 42 capsule 1       No Known Allergies    Review of Systems:  Not otbtained    Physical exam:  Vitals: There were no vitals  taken for this visit.  GEN: This is a well developed, well-nourished male in no acute distress, in a pleasant mood.    SKIN: Focused examination of the genital area was performed. Scribe was present.  - Verrucous papules of the base of the penis, along the mons pubis, on the shaft. 2-3mm in size, some coalescing  -No other lesions of concern on areas examined.       Impression/Plan:  1. Genital Warts - will do cryo, failed aldara, now Efudex.     Cryotherapy procedure note: After verbal consent and discussion of risks and benefits including but no limited to   blister, and pain, 21 were treated with 1-2mm freeze border for 2 cycles with liquid nitrogen. Post cryotherapy instructions were provided.     Will consider podophyllin at next visit.     Follow-up in 2 weeks.      Staff Involved:  Scribe/Staff    Scribe Disclosure  I, Mitchell Conway, am serving as a scribe to document services personally performed by Dr. Shasha Simental MD, based on data collection and the provider's statements to me.     Provider Disclosure:   The documentation recorded by the scribe accurately reflects the services I personally performed and the decisions made by me.    Shasha Simental MD    Department of Dermatology  [facility]: Phone: [phone], Fax:[phone]  [facility]: Phone: [phone], Fax: [phone]

## 2019-02-26 ENCOUNTER — OFFICE VISIT (OUTPATIENT)
Dept: DERMATOLOGY | Facility: CLINIC | Age: 44
End: 2019-02-26
Payer: COMMERCIAL

## 2019-02-26 DIAGNOSIS — A63.0 GENITAL WARTS: Primary | ICD-10-CM

## 2019-02-26 PROCEDURE — 17111 DESTRUCTION B9 LESIONS 15/>: CPT | Performed by: DERMATOLOGY

## 2019-02-26 ASSESSMENT — PAIN SCALES - GENERAL: PAINLEVEL: NO PAIN (0)

## 2019-02-26 NOTE — PATIENT INSTRUCTIONS
Cryotherapy    What is it?    Use of a very cold liquid, such as liquid nitrogen, to freeze and destroy abnormal skin cells that need to be removed    What should I expect?    Tenderness and redness    A small blister that might grow and fill with dark purple blood. There may be crusting.    More than one treatment may be needed if the lesions do not go away.    How do I care for the treated area?    Gently wash the area with your hands when bathing.    Use a thin layer of Vaseline to help with healing. You may use a Band-Aid.     The area should heal within 7-10 days and may leave behind a pink or lighter color.     Do not use an antibiotic or Neosporin ointment.     You may take acetaminophen (Tylenol) for pain.     Call your Doctor if you have:    Severe pain    Signs of infection (warmth, redness, cloudy yellow drainage, and or a bad smell)    Questions or concerns    Who should I call with questions?       [facility]: [phone]       [facility]: [phone]       For urgent needs outside of business hours call the [facility] at [phone]        and ask for the dermatology resident on call

## 2019-02-26 NOTE — PROGRESS NOTES
[facility] Dermatology Note      Dermatology Problem List:  1. Genital Warts  - s/p Aldara with no results  -s/p imiquimod with no results  -s/p cryotherapy with PCP with no results  -negative RPR and HIV  2. HSV positive per patient    Encounter Date: Feb 26, 2019    CC:  Chief Complaint   Patient presents with     Wart     genital area- efudex no improvement         History of Present Illness:  Mr. Sang Koroma is a 43 year old male who presents for follow up for genital warts. He was last seen 2/14/19 when he started Efudex. Pt thinks that he has improved 10-15% since last time. He has stopped using the cream. He wants to repeat cryo as he thinks it works.  No other concerns to address.     Past Medical History:   Patient Active Problem List   Diagnosis     Major depressive disorder in full remission (H)     Medial tibial stress syndrome     Elevated serum creatinine     Gout involving toe of right foot     Herpes simplex infection of genitourinary system     Nonintractable episodic headache     No past medical history on file.  No past surgical history on file.    Social History:  The patient works as an  and he works on his feet. The patient denies use of tanning beds.  Kept in chart for convenience.     Family History:  There is no family history of skin cancer.  Kept in chart for convenience.       Medications:  Current Outpatient Medications   Medication Sig Dispense Refill     acyclovir (ZOVIRAX) 400 MG tablet Take 1 tablet (400 mg) by mouth 2 times daily 180 tablet 3     fluorouracil (EFUDEX) 5 % cream Apply twice daily for 2-3 weeks or until irritation, then stop (Patient not taking: Reported on 2/14/2019) 40 g 1     indomethacin (INDOCIN) 25 MG capsule Take 1 capsule (25 mg) by mouth 3 times daily (with meals) (Patient not taking: Reported on 2/14/2019) 42 capsule 1       No Known Allergies    Review of Systems:  Not otbtained    Physical exam:  Vitals: There were  no vitals taken for this visit.  GEN: This is a well developed, well-nourished male in no acute distress, in a pleasant mood.    SKIN: Focused examination of the genital area was performed. Scribe was present.  - Verrucous papules on the scrotum, the penis, the mons pubis, perineum  -No other lesions of concern on areas examined.       Impression/Plan:  1. Genital Warts-improved with cryo    Cryotherapy procedure note: After verbal consent and discussion of risks and benefits including but no limited to   blister, and pain, 16 were treated with 1-2mm freeze border for 2 cycles with liquid nitrogen. Post cryotherapy instructions were provided.     Consider podophyllin in future.     Follow-up in 2-3 weeks.      Staff Involved:  Scribe/Staff    Scribe Disclosure  I, Mitchell Conway, am serving as a scribe to document services personally performed by Dr. Shasha Simental MD, based on data collection and the provider's statements to me.     Provider Disclosure:   The documentation recorded by the scribe accurately reflects the services I personally performed and the decisions made by me.    Shasha Simental MD    Department of Dermatology  [facility]: Phone: [phone], Fax:[phone]  [facility]: Phone: [phone], Fax: [phone]

## 2019-02-26 NOTE — NURSING NOTE
Sang Koroma's goals for this visit include:   Chief Complaint   Patient presents with     Wart     genital area- efudex no improvement       He requests these members of his care team be copied on today's visit information:     PCP: Mian Hunt    Referring Provider:  No referring provider defined for this encounter.    There were no vitals taken for this visit.    Do you need any medication refills at today's visit? Wendi Scales LPN

## 2019-02-26 NOTE — LETTER
2/26/2019         RE: Sang Koroma  [address] [facility] [address]        Dear Colleague,    Thank you for referring your patient, Sang Koroma, to the [facility]. Please see a copy of my visit note below.    [facility] Dermatology Note      Dermatology Problem List:  1. Genital Warts  - s/p Aldara with no results  -s/p imiquimod with no results  -s/p cryotherapy with PCP with no results  -negative RPR and HIV  2. HSV positive per patient    Encounter Date: Feb 26, 2019    CC:  Chief Complaint   Patient presents with     Wart     genital area- efudex no improvement         History of Present Illness:  Mr. Sang Koroma is a 43 year old male who presents for follow up for genital warts. He was last seen 2/14/19 when he started Efudex. Pt thinks that he has improved 10-15% since last time. He has stopped using the cream. He wants to repeat cryo as he thinks it works.  No other concerns to address.     Past Medical History:   Patient Active Problem List   Diagnosis     Major depressive disorder in full remission (H)     Medial tibial stress syndrome     Elevated serum creatinine     Gout involving toe of right foot     Herpes simplex infection of genitourinary system     Nonintractable episodic headache     No past medical history on file.  No past surgical history on file.    Social History:  The patient works as an  and he works on his feet. The patient denies use of tanning beds.  Kept in chart for convenience.     Family History:  There is no family history of skin cancer.  Kept in chart for convenience.       Medications:  Current Outpatient Medications   Medication Sig Dispense Refill     acyclovir (ZOVIRAX) 400 MG tablet Take 1 tablet (400 mg) by mouth 2 times daily 180 tablet 3     fluorouracil (EFUDEX) 5 % cream Apply twice daily for 2-3 weeks or until irritation, then stop (Patient not taking: Reported on 2/14/2019) 40 g 1      indomethacin (INDOCIN) 25 MG capsule Take 1 capsule (25 mg) by mouth 3 times daily (with meals) (Patient not taking: Reported on 2/14/2019) 42 capsule 1       No Known Allergies    Review of Systems:  Not otbtained    Physical exam:  Vitals: There were no vitals taken for this visit.  GEN: This is a well developed, well-nourished male in no acute distress, in a pleasant mood.    SKIN: Focused examination of the genital area was performed. Scribe was present.  - Verrucous papules on the scrotum, the penis, the mons pubis, perineum  -No other lesions of concern on areas examined.       Impression/Plan:  1. Genital Warts-improved with cryo    Cryotherapy procedure note: After verbal consent and discussion of risks and benefits including but no limited to   blister, and pain, 16 were treated with 1-2mm freeze border for 2 cycles with liquid nitrogen. Post cryotherapy instructions were provided.     Consider podophyllin in future.     Follow-up in 2-3 weeks.      Staff Involved:  Scribe/Staff    Scribe Disclosure  I, Mitchell Conway, am serving as a scribe to document services personally performed by Dr. Shasha Simental MD, based on data collection and the provider's statements to me.     Provider Disclosure:   The documentation recorded by the scribe accurately reflects the services I personally performed and the decisions made by me.    Shasha Simental MD    Department of Dermatology  [facility]: Phone: [phone], Fax:[phone]  [facility]: Phone: [phone], Fax: [phone]        Again, thank you for allowing me to participate in the care of your patient.        Sincerely,        Shasha Simental MD

## 2019-03-19 ENCOUNTER — OFFICE VISIT (OUTPATIENT)
Dept: DERMATOLOGY | Facility: CLINIC | Age: 44
End: 2019-03-19
Payer: COMMERCIAL

## 2019-03-19 DIAGNOSIS — A63.0 GENITAL WARTS: Primary | ICD-10-CM

## 2019-03-19 PROCEDURE — 17111 DESTRUCTION B9 LESIONS 15/>: CPT | Performed by: DERMATOLOGY

## 2019-03-19 NOTE — LETTER
3/19/2019         RE: Sang Koroma  [address] [facility] [address]        Dear Colleague,    Thank you for referring your patient, Sang Koroma, to the [facility]. Please see a copy of my visit note below.    [facility] Dermatology Note      Dermatology Problem List:  1. Genital Warts  - s/p Aldara with no results  -s/p imiquimod with no results  -s/p cryotherapy with PCP with no results  -negative RPR and HIV  2. HSV positive per patient    Encounter Date: Mar 19, 2019    CC:  Chief Complaint   Patient presents with     RECHECK     cryo          History of Present Illness:  Mr. Sang Koroma is a 43 year old male who presents for follow up for genital warts. He was last seen 2/26/19 for cryotherapy. The patient still has warts to treat today.  Overall, improved .Wants all warts treated on penis, scrotum. No other concerns to address today.    Past Medical History:   Patient Active Problem List   Diagnosis     Major depressive disorder in full remission (H)     Medial tibial stress syndrome     Elevated serum creatinine     Gout involving toe of right foot     Herpes simplex infection of genitourinary system     Nonintractable episodic headache     No past medical history on file.  No past surgical history on file.    Social History:  The patient works as an  and he works on his feet. The patient denies use of tanning beds.  Kept in chart for convenience.     Family History:  There is no family history of skin cancer.  Kept in chart for convenience.       Medications:  Current Outpatient Medications   Medication Sig Dispense Refill     acyclovir (ZOVIRAX) 400 MG tablet Take 1 tablet (400 mg) by mouth 2 times daily 180 tablet 3     fluorouracil (EFUDEX) 5 % cream Apply twice daily for 2-3 weeks or until irritation, then stop 40 g 1     indomethacin (INDOCIN) 25 MG capsule Take 1 capsule (25 mg) by mouth 3 times daily (with meals)  42 capsule 1       No Known Allergies    Review of Systems:  Not otbtained    Physical exam:  Vitals: There were no vitals taken for this visit.  GEN: This is a well developed, well-nourished male in no acute distress, in a pleasant mood.    SKIN: Focused examination of the genital area was performed.   - There are verrucous papules with thrombosed capillaries interrupting dermatoglyphics on the scrotum, penis, and perineum x25.    Impression/Plan:  1. Genital Warts- several, improving, no complaints of pain with last treatment    Cryotherapy procedure note: After verbal consent and discussion of risks and benefits including but no limited to   blister, and pain, 25 were treated with 1-2mm freeze border for 2 cycles with liquid nitrogen. Post cryotherapy instructions were provided.       Follow-up in 2-3 weeks.      Staff Involved:  Scribe/Staff    Scribe Disclosure  I, Mitchell Conway, am serving as a scribe to document services personally performed by Dr. Shasha Simental MD, based on data collection and the provider's statements to me.     Provider Disclosure:   The documentation recorded by the scribe accurately reflects the services I personally performed and the decisions made by me.    Shasha Simental MD    Department of Dermatology  [facility]: Phone: [phone], Fax:[phone]  [facility]: Phone: [phone], Fax: [phone]        Again, thank you for allowing me to participate in the care of your patient.        Sincerely,        Shahsa Simental MD

## 2019-03-19 NOTE — PATIENT INSTRUCTIONS
Cryotherapy    What is it?    Use of a very cold liquid, such as liquid nitrogen, to freeze and destroy abnormal skin cells that need to be removed    What should I expect?    Tenderness and redness    A small blister that might grow and fill with dark purple blood. There may be crusting.    More than one treatment may be needed if the lesions do not go away.    How do I care for the treated area?    Gently wash the area with your hands when bathing.    Use a thin layer of Vaseline to help with healing. You may use a Band-Aid.     The area should heal within 7-10 days and may leave behind a pink or lighter color.     Do not use an antibiotic or Neosporin ointment.     You may take acetaminophen (Tylenol) for pain.     Call your Doctor if you have:    Severe pain    Signs of infection (warmth, redness, cloudy yellow drainage, and or a bad smell)    Questions or concerns    Who should I call with questions?       [facility]: [phone]       [facility]: [phone]       For urgent needs outside of business hours call the [facility] at [phone]        and ask for the dermatology resident on call

## 2019-03-19 NOTE — NURSING NOTE
Sang Koroma's goals for this visit include:   Chief Complaint   Patient presents with     RECHECK     cryo        He requests these members of his care team be copied on today's visit information: yes    PCP: Mian Hunt    Referring Provider:  No referring provider defined for this encounter.    There were no vitals taken for this visit.    Do you need any medication refills at today's visit? No     Amorrjosué Meneses CMA

## 2019-03-19 NOTE — PROGRESS NOTES
[facility] Dermatology Note      Dermatology Problem List:  1. Genital Warts  - s/p Aldara with no results  -s/p imiquimod with no results  -s/p cryotherapy with PCP with no results  -negative RPR and HIV  2. HSV positive per patient    Encounter Date: Mar 19, 2019    CC:  Chief Complaint   Patient presents with     RECHECK     cryo          History of Present Illness:  Mr. Sang Koroma is a 43 year old male who presents for follow up for genital warts. He was last seen 2/26/19 for cryotherapy. The patient still has warts to treat today.  Overall, improved .Wants all warts treated on penis, scrotum. No other concerns to address today.    Past Medical History:   Patient Active Problem List   Diagnosis     Major depressive disorder in full remission (H)     Medial tibial stress syndrome     Elevated serum creatinine     Gout involving toe of right foot     Herpes simplex infection of genitourinary system     Nonintractable episodic headache     No past medical history on file.  No past surgical history on file.    Social History:  The patient works as an  and he works on his feet. The patient denies use of tanning beds.  Kept in chart for convenience.     Family History:  There is no family history of skin cancer.  Kept in chart for convenience.       Medications:  Current Outpatient Medications   Medication Sig Dispense Refill     acyclovir (ZOVIRAX) 400 MG tablet Take 1 tablet (400 mg) by mouth 2 times daily 180 tablet 3     fluorouracil (EFUDEX) 5 % cream Apply twice daily for 2-3 weeks or until irritation, then stop 40 g 1     indomethacin (INDOCIN) 25 MG capsule Take 1 capsule (25 mg) by mouth 3 times daily (with meals) 42 capsule 1       No Known Allergies    Review of Systems:  Not otbtained    Physical exam:  Vitals: There were no vitals taken for this visit.  GEN: This is a well developed, well-nourished male in no acute distress, in a pleasant mood.    SKIN: Focused  examination of the genital area was performed.   - There are verrucous papules with thrombosed capillaries interrupting dermatoglyphics on the scrotum, penis, and perineum x25.    Impression/Plan:  1. Genital Warts- several, improving, no complaints of pain with last treatment    Cryotherapy procedure note: After verbal consent and discussion of risks and benefits including but no limited to   blister, and pain, 25 were treated with 1-2mm freeze border for 2 cycles with liquid nitrogen. Post cryotherapy instructions were provided.       Follow-up in 2-3 weeks.      Staff Involved:  Scribe/Staff    Scribe Disclosure  I, Mitchell Conway, am serving as a scribe to document services personally performed by Dr. Shasha Simental MD, based on data collection and the provider's statements to me.     Provider Disclosure:   The documentation recorded by the scribe accurately reflects the services I personally performed and the decisions made by me.    Shasha Simental MD    Department of Dermatology  [facility]: Phone: [phone], Fax:[phone]  [facility]: Phone: [phone], Fax: [phone]

## 2019-04-19 ENCOUNTER — OFFICE VISIT (OUTPATIENT)
Dept: DERMATOLOGY | Facility: CLINIC | Age: 44
End: 2019-04-19
Payer: COMMERCIAL

## 2019-04-19 DIAGNOSIS — A63.0 CONDYLOMA: Primary | ICD-10-CM

## 2019-04-19 PROCEDURE — 17110 DESTRUCTION B9 LES UP TO 14: CPT | Performed by: DERMATOLOGY

## 2019-04-19 NOTE — NURSING NOTE
Sang Koroma's goals for this visit include:   Chief Complaint   Patient presents with     RECHECK       He requests these members of his care team be copied on today's visit information: no     PCP: Mian Hunt    Referring Provider:  No referring provider defined for this encounter.    There were no vitals taken for this visit.    Do you need any medication refills at today's visit? No     Amorrjosué Meneses CMA

## 2019-04-19 NOTE — PROGRESS NOTES
[facility] Dermatology Note      Dermatology Problem List:  1. Genital Warts  - s/p Aldara with no results  -s/p imiquimod with no results  -s/p cryotherapy with PCP with no results  -negative RPR and HIV  2. HSV positive per patient    Encounter Date: Apr 19, 2019    CC:  Chief Complaint   Patient presents with     RECHECK         History of Present Illness:  Mr. Sang Koroma is a 43 year old male who presents for follow up for genital warts. He was last seen 3/19/19. The patient reports that he feels he has improved. However, he is not sure. No other concerns to address.     Past Medical History:   Patient Active Problem List   Diagnosis     Major depressive disorder in full remission (H)     Medial tibial stress syndrome     Elevated serum creatinine     Gout involving toe of right foot     Herpes simplex infection of genitourinary system     Nonintractable episodic headache     No past medical history on file.  No past surgical history on file.    Social History:  The patient works as an  and he works on his feet. The patient denies use of tanning beds.  Kept in chart for convenience.     Family History:  There is no family history of skin cancer.  Kept in chart for convenience.       Medications:  Current Outpatient Medications   Medication Sig Dispense Refill     acyclovir (ZOVIRAX) 400 MG tablet Take 1 tablet (400 mg) by mouth 2 times daily 180 tablet 3     fluorouracil (EFUDEX) 5 % cream Apply twice daily for 2-3 weeks or until irritation, then stop 40 g 1     indomethacin (INDOCIN) 25 MG capsule Take 1 capsule (25 mg) by mouth 3 times daily (with meals) 42 capsule 1       No Known Allergies    Review of Systems:  Not obtained    Physical exam:  Vitals: There were no vitals taken for this visit.  GEN: This is a well developed, well-nourished male in no acute distress, in a pleasant mood.    SKIN: Focused examination of the genital area was performed.  Scribe present.    - There are verrucous papules with thrombosed capillaries interrupting dermatoglyphics on the mons pubis x8, base of the penis x6, scrotum x3    Impression/Plan:  1. Genital Warts- Improving. Pt would like to pursue HPV vaccine. He declines topicals. Does not want topicals    Cryotherapy procedure note: After verbal consent and discussion of risks and benefits including but no limited to   blister, and pain, 17 were treated with 1-2mm freeze border for 2 cycles with liquid nitrogen. Post cryotherapy instructions were provided.     Follow-up 1 month.      Staff Involved:  Scribe/Staff    Scribe Disclosure  I, Mitchell Conway, am serving as a scribe to document services personally performed by Dr. Shasha Simental MD, based on data collection and the provider's statements to me.     Provider Disclosure:   The documentation recorded by the scribe accurately reflects the services I personally performed and the decisions made by me.    Shasha Simental MD    Department of Dermatology  [facility]: Phone: [phone], Fax:[phone]  [facility]: Phone: [phone], Fax: [phone]

## 2019-04-19 NOTE — PATIENT INSTRUCTIONS
Cryotherapy    What is it?    Use of a very cold liquid, such as liquid nitrogen, to freeze and destroy abnormal skin cells that need to be removed    What should I expect?    Tenderness and redness    A small blister that might grow and fill with dark purple blood. There may be crusting.    More than one treatment may be needed if the lesions do not go away.    How do I care for the treated area?    Gently wash the area with your hands when bathing.    Use a thin layer of Vaseline to help with healing. You may use a Band-Aid.     The area should heal within 7-10 days and may leave behind a pink or lighter color.     Do not use an antibiotic or Neosporin ointment.     You may take acetaminophen (Tylenol) for pain.     Call your Doctor if you have:    Severe pain    Signs of infection (warmth, redness, cloudy yellow drainage, and or a bad smell)    Questions or concerns    Who should I call with questions?       [facility]: [phone]       [facility]: [phone]       For urgent needs outside of business hours call the [facility] at [phone]        and ask for the dermatology resident on call

## 2019-04-19 NOTE — LETTER
4/19/2019         RE: Sang Koroma  [address] [facility] [address]        Dear Colleague,    Thank you for referring your patient, Sang Koroma, to the [facility]. Please see a copy of my visit note below.    [facility] Dermatology Note      Dermatology Problem List:  1. Genital Warts  - s/p Aldara with no results  -s/p imiquimod with no results  -s/p cryotherapy with PCP with no results  -negative RPR and HIV  2. HSV positive per patient    Encounter Date: Apr 19, 2019    CC:  Chief Complaint   Patient presents with     RECHECK         History of Present Illness:  Mr. Sang Koroma is a 43 year old male who presents for follow up for genital warts. He was last seen 3/19/19. The patient reports that he feels he has improved. However, he is not sure. No other concerns to address.     Past Medical History:   Patient Active Problem List   Diagnosis     Major depressive disorder in full remission (H)     Medial tibial stress syndrome     Elevated serum creatinine     Gout involving toe of right foot     Herpes simplex infection of genitourinary system     Nonintractable episodic headache     No past medical history on file.  No past surgical history on file.    Social History:  The patient works as an  and he works on his feet. The patient denies use of tanning beds.  Kept in chart for convenience.     Family History:  There is no family history of skin cancer.  Kept in chart for convenience.       Medications:  Current Outpatient Medications   Medication Sig Dispense Refill     acyclovir (ZOVIRAX) 400 MG tablet Take 1 tablet (400 mg) by mouth 2 times daily 180 tablet 3     fluorouracil (EFUDEX) 5 % cream Apply twice daily for 2-3 weeks or until irritation, then stop 40 g 1     indomethacin (INDOCIN) 25 MG capsule Take 1 capsule (25 mg) by mouth 3 times daily (with meals) 42 capsule 1       No Known Allergies    Review of  Systems:  Not obtained    Physical exam:  Vitals: There were no vitals taken for this visit.  GEN: This is a well developed, well-nourished male in no acute distress, in a pleasant mood.    SKIN: Focused examination of the genital area was performed.  Scribe present.   - There are verrucous papules with thrombosed capillaries interrupting dermatoglyphics on the mons pubis x8, base of the penis x6, scrotum x3    Impression/Plan:  1. Genital Warts- Improving. Pt would like to pursue HPV vaccine. He declines topicals. Does not want topicals    Cryotherapy procedure note: After verbal consent and discussion of risks and benefits including but no limited to   blister, and pain, 17 were treated with 1-2mm freeze border for 2 cycles with liquid nitrogen. Post cryotherapy instructions were provided.     Follow-up 1 month.      Staff Involved:  Scribe/Staff    Scribe Disclosure  I, Mitchell Conway, am serving as a scribe to document services personally performed by Dr. Shasha Simental MD, based on data collection and the provider's statements to me.     Provider Disclosure:   The documentation recorded by the scribe accurately reflects the services I personally performed and the decisions made by me.    Shasha Simental MD    Department of Dermatology  [facility]: Phone: [phone], Fax:[phone]  [facility]: Phone: [phone], Fax: [phone]        Again, thank you for allowing me to participate in the care of your patient.        Sincerely,        Shasha Simental MD

## 2019-05-09 ENCOUNTER — OFFICE VISIT (OUTPATIENT)
Dept: DERMATOLOGY | Facility: CLINIC | Age: 44
End: 2019-05-09
Payer: COMMERCIAL

## 2019-05-09 DIAGNOSIS — A63.0 GENITAL WARTS: Primary | ICD-10-CM

## 2019-05-09 PROCEDURE — 17111 DESTRUCTION B9 LESIONS 15/>: CPT | Performed by: DERMATOLOGY

## 2019-05-09 NOTE — PATIENT INSTRUCTIONS
Cryotherapy    What is it?    Use of a very cold liquid, such as liquid nitrogen, to freeze and destroy abnormal skin cells that need to be removed    What should I expect?    Tenderness and redness    A small blister that might grow and fill with dark purple blood. There may be crusting.    More than one treatment may be needed if the lesions do not go away.    How do I care for the treated area?    Gently wash the area with your hands when bathing.    Use a thin layer of Vaseline to help with healing. You may use a Band-Aid.     The area should heal within 7-10 days and may leave behind a pink or lighter color.     Do not use an antibiotic or Neosporin ointment.     You may take acetaminophen (Tylenol) for pain.     Call your Doctor if you have:    Severe pain    Signs of infection (warmth, redness, cloudy yellow drainage, and or a bad smell)    Questions or concerns    Who should I call with questions?       [facility]: [phone]       [facility]: [phone]       For urgent needs outside of business hours call the [facility] at [phone]        and ask for the dermatology resident on call

## 2019-05-09 NOTE — LETTER
5/9/2019         RE: Sang Koroma  [address] [facility] [address]        Dear Colleague,    Thank you for referring your patient, Sang Koroma, to the [facility]. Please see a copy of my visit note below.    [facility] Dermatology Note      Dermatology Problem List:  1. Genital Warts  - s/p Aldara with no results  -s/p imiquimod with no results  -s/p cryotherapy with PCP with no results  -negative RPR and HIV  2. HSV positive per patient    Encounter Date: May 9, 2019    CC:  No chief complaint on file.        History of Present Illness:  Mr. Sang Koroma is a 43 year old male who presents for follow up for genital warts. He was last seen 4/19/19. The patient reports that he feels he has improved. However, he is not sure. No other concerns to address. Good blistering. No other changes or medical problems. Feeling fewer lesions. The patient is otherwise feeling well. There are no other skin concerns at this time.        Past Medical History:   Patient Active Problem List   Diagnosis     Major depressive disorder in full remission (H)     Medial tibial stress syndrome     Elevated serum creatinine     Gout involving toe of right foot     Herpes simplex infection of genitourinary system     Nonintractable episodic headache     No past medical history on file.  No past surgical history on file.    Social History:  The patient works as an  and he works on his feet. The patient denies use of tanning beds.  Kept in chart for convenience.     Family History:  There is no family history of skin cancer.  Kept in chart for convenience.       Medications:  Current Outpatient Medications   Medication Sig Dispense Refill     acyclovir (ZOVIRAX) 400 MG tablet Take 1 tablet (400 mg) by mouth 2 times daily 180 tablet 3     fluorouracil (EFUDEX) 5 % cream Apply twice daily for 2-3 weeks or until irritation, then stop 40 g 1     indomethacin  (INDOCIN) 25 MG capsule Take 1 capsule (25 mg) by mouth 3 times daily (with meals) 42 capsule 1       No Known Allergies    Review of Systems:  Not obtained    Physical exam:  Vitals: There were no vitals taken for this visit.  GEN: This is a well developed, well-nourished male in no acute distress, in a pleasant mood.    SKIN: Focused examination of the genital area was performed.  Scribe present.   - Skin colored flat paulpes at base of penis and scrotum and Mons pubis   - similar lesions on the bilateral groin  - Declines buttocks to be  examined     Impression/Plan:  1. Genital Warts- Improved, has been recommended HPV vaccine in the past    Cryotherapy procedure note: After verbal consent and discussion of risks and benefits including but no limited to blister, and pain, 19 were treated with 1-2mm freeze border for 2 cycles with liquid nitrogen. Post cryotherapy instructions were provided.     Follow-up 2-3 weeks      Staff Involved:  Scribe/Staff    Scribe Disclosure  I, Brent Baldwin, am serving as a scribe to document services personally performed by Dr. Shasha Simental MD, based on data collection and the provider's statements to me.     Provider Disclosure:   The documentation recorded by the scribe accurately reflects the services I personally performed and the decisions made by me.    Shasha Simental MD    Department of Dermatology  [facility]: Phone: [phone], Fax:[phone]  [facility]: Phone: [phone], Fax: [phone]              Again, thank you for allowing me to participate in the care of your patient.        Sincerely,        Shasha Simental MD

## 2019-05-09 NOTE — PROGRESS NOTES
[facility] Dermatology Note      Dermatology Problem List:  1. Genital Warts  - s/p Aldara with no results  -s/p imiquimod with no results  -s/p cryotherapy with PCP with no results  -negative RPR and HIV  2. HSV positive per patient    Encounter Date: May 9, 2019    CC:  No chief complaint on file.        History of Present Illness:  Mr. Sang Koroma is a 43 year old male who presents for follow up for genital warts. He was last seen 4/19/19. The patient reports that he feels he has improved. However, he is not sure. No other concerns to address. Good blistering. No other changes or medical problems. Feeling fewer lesions. The patient is otherwise feeling well. There are no other skin concerns at this time.        Past Medical History:   Patient Active Problem List   Diagnosis     Major depressive disorder in full remission (H)     Medial tibial stress syndrome     Elevated serum creatinine     Gout involving toe of right foot     Herpes simplex infection of genitourinary system     Nonintractable episodic headache     No past medical history on file.  No past surgical history on file.    Social History:  The patient works as an  and he works on his feet. The patient denies use of tanning beds.  Kept in chart for convenience.     Family History:  There is no family history of skin cancer.  Kept in chart for convenience.       Medications:  Current Outpatient Medications   Medication Sig Dispense Refill     acyclovir (ZOVIRAX) 400 MG tablet Take 1 tablet (400 mg) by mouth 2 times daily 180 tablet 3     fluorouracil (EFUDEX) 5 % cream Apply twice daily for 2-3 weeks or until irritation, then stop 40 g 1     indomethacin (INDOCIN) 25 MG capsule Take 1 capsule (25 mg) by mouth 3 times daily (with meals) 42 capsule 1       No Known Allergies    Review of Systems:  Not obtained    Physical exam:  Vitals: There were no vitals taken for this visit.  GEN: This is a well developed,  well-nourished male in no acute distress, in a pleasant mood.    SKIN: Focused examination of the genital area was performed.  Scribe present.   - Skin colored flat paulpes at base of penis and scrotum and Mons pubis   - similar lesions on the bilateral groin  - Declines buttocks to be  examined     Impression/Plan:  1. Genital Warts- Improved, has been recommended HPV vaccine in the past    Cryotherapy procedure note: After verbal consent and discussion of risks and benefits including but no limited to blister, and pain, 19 were treated with 1-2mm freeze border for 2 cycles with liquid nitrogen. Post cryotherapy instructions were provided.     Follow-up 2-3 weeks      Staff Involved:  Scribe/Staff    Scribe Disclosure  I, Brent Baldwin, am serving as a scribe to document services personally performed by Dr. Shasha Simental MD, based on data collection and the provider's statements to me.     Provider Disclosure:   The documentation recorded by the scribe accurately reflects the services I personally performed and the decisions made by me.    Shasha Simental MD    Department of Dermatology  [facility]: Phone: [phone], Fax:[phone]  [facility]: Phone: [phone], Fax: [phone]

## 2019-06-28 ENCOUNTER — ALLIED HEALTH/NURSE VISIT (OUTPATIENT)
Dept: PEDIATRICS | Facility: CLINIC | Age: 44
End: 2019-06-28
Payer: COMMERCIAL

## 2019-06-28 ENCOUNTER — OFFICE VISIT (OUTPATIENT)
Dept: DERMATOLOGY | Facility: CLINIC | Age: 44
End: 2019-06-28
Payer: COMMERCIAL

## 2019-06-28 DIAGNOSIS — B00.9 HERPES INFECTION: ICD-10-CM

## 2019-06-28 DIAGNOSIS — A63.0 CONDYLOMA: Primary | ICD-10-CM

## 2019-06-28 DIAGNOSIS — Z23 NEED FOR VACCINATION: Primary | ICD-10-CM

## 2019-06-28 PROCEDURE — 88342 IMHCHEM/IMCYTCHM 1ST ANTB: CPT | Mod: TC | Performed by: DERMATOLOGY

## 2019-06-28 PROCEDURE — 88305 TISSUE EXAM BY PATHOLOGIST: CPT | Mod: TC | Performed by: DERMATOLOGY

## 2019-06-28 PROCEDURE — 17111 DESTRUCTION B9 LESIONS 15/>: CPT | Performed by: DERMATOLOGY

## 2019-06-28 PROCEDURE — 90651 9VHPV VACCINE 2/3 DOSE IM: CPT

## 2019-06-28 PROCEDURE — 90471 IMMUNIZATION ADMIN: CPT

## 2019-06-28 ASSESSMENT — PAIN SCALES - GENERAL: PAINLEVEL: NO PAIN (0)

## 2019-06-28 NOTE — PATIENT INSTRUCTIONS
HPV Vaccine    Wound Care After a Biopsy    What is a skin biopsy?  A skin biopsy allows the doctor to examine a very small piece of tissue under the microscope to determine the diagnosis and the best treatment for the skin condition. A local anesthetic (numbing medicine)  is injected with a very small needle into the skin area to be tested. A small piece of skin is taken from the area. Sometimes a suture (stitch) is used.     What are the risks of a skin biopsy?  I will experience scar, bleeding, swelling, pain, crusting and redness. I may experience incomplete removal or recurrence. Risks of this procedure are excessive bleeding, bruising, infection, nerve damage, numbness, thick (hypertrophic or keloidal) scar and non-diagnostic biopsy.    How should I care for my wound for the first 24 hours?    Keep the wound dry and covered for 24 hours    If it bleeds, hold direct pressure on the area for 15 minutes. If bleeding does not stop then go to the emergency room    Avoid strenuous exercise the first 1-2 days or as your doctor instructs you    How should I care for the wound after 24 hours?    After 24 hours, remove the bandage    You may bathe or shower as normal    If you had a scalp biopsy, you can shampoo as usual and can use shower water to clean the biopsy site daily    Clean the wound twice a day with gentle soap and water    Do not scrub, be gentle    Apply white petroleum/Vaseline after cleaning the wound with a cotton swab or a clean finger, and keep the site covered with a Bandaid /bandage. Bandages are not necessary with a scalp biopsy    If you are unable to cover the site with a Bandaid /bandage, re-apply ointment 2-3 times a day to keep the site moist. Moisture will help with healing    Avoid strenuous activity for first 1-2 days    Avoid lakes, rivers, pools, and oceans until the stitches are removed or the site is healed    How do I clean my wound?    Wash hands thoroughly with soap or use hand   before all wound care    Clean the wound with gentle soap and water    Apply white petroleum/Vaseline  to wound after it is clean    Replace the Bandaid /bandage to keep the wound covered for the first few days or as instructed by your doctor    If you had a scalp biopsy, warm shower water to the area on a daily basis should suffice    What should I use to clean my wound?     Cotton-tipped applicators (Qtips )    White petroleum jelly (Vaseline ). Use a clean new container and use Q-tips to apply.    Bandaids   as needed    Gentle soap     How should I care for my wound long term?    Do not get your wound dirty    Keep up with wound care for one week or until the area is healed.    A small scab will form and fall off by itself when the area is completely healed. The area will be red and will become pink in color as it heals. Sun protection is very important for how your scar will turn out. Sunscreen with an SPF 30 or greater is recommended once the area is healed.    You should have some soreness but it should be mild and slowly go away over several days. Talk to your doctor about using tylenol for pain,    When should I call my doctor?  If you have increased:     Pain or swelling    Pus or drainage (clear or slightly yellow drainage is ok)    Temperature over 100F    Spreading redness or warmth around wound    When will I hear about my results?  The biopsy results can take 2-3 weeks to come back. The clinic will call you with the results, send you a [employer] message, or have you schedule a follow-up clinic or phone time to discuss the results. Contact our clinics if you do not hear from us in 3 weeks.     Who should I call with questions?    [facility]: [phone]     [facility]: [phone]    For urgent needs outside of business hours call the [facility] at [phone] and ask for the dermatology resident on  call        Cryotherapy    What is it?    Use of a very cold liquid, such as liquid nitrogen, to freeze and destroy abnormal skin cells that need to be removed    What should I expect?    Tenderness and redness    A small blister that might grow and fill with dark purple blood. There may be crusting.    More than one treatment may be needed if the lesions do not go away.    How do I care for the treated area?    Gently wash the area with your hands when bathing.    Use a thin layer of Vaseline to help with healing. You may use a Band-Aid.     The area should heal within 7-10 days and may leave behind a pink or lighter color.     Do not use an antibiotic or Neosporin ointment.     You may take acetaminophen (Tylenol) for pain.     Call your Doctor if you have:    Severe pain    Signs of infection (warmth, redness, cloudy yellow drainage, and or a bad smell)    Questions or concerns    Who should I call with questions?       [facility]: [phone]       [facility]: [phone]       For urgent needs outside of business hours call the [facility] at [phone]        and ask for the dermatology resident on call

## 2019-06-28 NOTE — LETTER
6/28/2019         RE: Sang Koroma  [address] [facility] [address]        Dear Colleague,    Thank you for referring your patient, Sang Koroma, to the [facility]. Please see a copy of my visit note below.    [facility] Dermatology Note      Dermatology Problem List:  1. Genital Warts  - s/p Aldara with no results  -s/p imiquimod with no results  -s/p cryotherapy with PCP with no results  -negative RPR and HIV  -failed Efudex  2. HSV positive per patient    Encounter Date: Jun 28, 2019    CC:  Chief Complaint   Patient presents with     Wart     Dmitry is returning to discuss genital warts; one has increased in size and is painful         History of Present Illness:  Mr. Sang Koroma is a 43 year old male who presents for follow up for genital warts. He was last seen on 5/9/19 when the genital warts were treated with cryo. Today he reports that one of the warts on the scrotum has increased in size and is very painful. Has not spread.     Past Medical History:   Patient Active Problem List   Diagnosis     Major depressive disorder in full remission (H)     Medial tibial stress syndrome     Elevated serum creatinine     Gout involving toe of right foot     Herpes simplex infection of genitourinary system     Nonintractable episodic headache     No past medical history on file.  No past surgical history on file.    Social History:  The patient works as an  and he works on his feet. The patient denies use of tanning beds.  Kept in chart for convenience.     Family History:  There is no family history of skin cancer.  Kept in chart for convenience.       Medications:  Current Outpatient Medications   Medication Sig Dispense Refill     acyclovir (ZOVIRAX) 400 MG tablet Take 1 tablet (400 mg) by mouth 2 times daily 180 tablet 3     fluorouracil (EFUDEX) 5 % cream Apply twice daily for 2-3 weeks or until irritation, then stop 40 g 1      indomethacin (INDOCIN) 25 MG capsule Take 1 capsule (25 mg) by mouth 3 times daily (with meals) 42 capsule 1       No Known Allergies    Review of Systems:  Skin: as per HPI.  Constitutional: Pt is generally feeling well.     Physical exam:  Vitals: There were no vitals taken for this visit.  GEN: This is a well developed, well-nourished male in no acute distress, in a pleasant mood.    SKIN: Focused examination of the genital area was performed.   - Skin colored flat paulpes at base of penis and scrotum and Mons pubis   - 1 verrucous papule on the right mons pubis  - 1 verrucous papule on the right base of the penis     Impression/Plan:  1. Genital Warts - Improved, Schedule for HPV vaccine, shave remove 2 larger lesions today, continue cryo, failed topicals(aldara and efudex)    Cryotherapy procedure note: After verbal consent and discussion of risks and benefits including but no limited to blister, and pain, 25 were treated on the base of penis , mons pubis, scrotum and right groin with 1-2mm freeze border for 2 cycles with liquid nitrogen. Post cryotherapy instructions were provided.      Failed Efudex     Start Pedofilex       Shave removal:  After discussion of benefits and risks including but not limited to bleeding/bruising, pain/swelling, infection, scar, incomplete removal, nerve damage/numbness, recurrence, and non-diagnostic biopsy, written consent, verbal consent and photographs were obtained. Time-out was performed. The area was cleaned with isopropyl alcohol. 0.5mL of 1% lidocaine with epinephrine was injected to obtain adequate anesthesia of the lesion on the right mons pubis. A shave removal was performed. Hemostasis was achieved with aluminium chloride. Base of the lesion was treated with cryotherapy. Vaseline and guaze were applied. The patient tolerated the procedure and no complications were noted. The patient was provided with verbal and written post care instructions.       Shave removal:   After discussion of benefits and risks including but not limited to bleeding/bruising, pain/swelling, infection, scar, incomplete removal, nerve damage/numbness, recurrence, and non-diagnostic biopsy, written consent, verbal consent and photographs were obtained. Time-out was performed. The area was cleaned with isopropyl alcohol. 0.5mL of 1% lidocaine with epinephrine was injected to obtain adequate anesthesia of the lesion on the right base of the penis. A shave removal was performed. Hemostasis was achieved with aluminium chloride. Base of the lesion was treated with cryotherapy. Vaseline and gauze were applied. The patient tolerated the procedure and no complications were noted. The patient was provided with verbal and written post care instructions.       Follow-up in 3 weeks for cryotherapy, earlier for new or changing lesions. For future cryo, Pt would like to be scheduled with Dr. Rg.     Staff Involved:  Scribe/Staff    Scribe Disclosure  I, Antoinette English, am serving as a scribe to document services personally performed by Dr. Shasha Simental MD, based on data collection and the provider's statements to me.     Provider Disclosure:   The documentation recorded by the scribe accurately reflects the services I personally performed and the decisions made by me.    Shasha Simental MD    Department of Dermatology  [facility]: Phone: [phone], Fax:[phone]  [facility]: Phone: [phone], Fax: [phone]              Again, thank you for allowing me to participate in the care of your patient.        Sincerely,        Shasha Simental MD

## 2019-06-28 NOTE — PROGRESS NOTES
[facility] Dermatology Note      Dermatology Problem List:  1. Genital Warts  - s/p Aldara with no results  -s/p imiquimod with no results  -s/p cryotherapy with PCP with no results  -negative RPR and HIV  -failed Efudex  2. HSV positive per patient    Encounter Date: Jun 28, 2019    CC:  Chief Complaint   Patient presents with     Wart     Dmitry is returning to discuss genital warts; one has increased in size and is painful         History of Present Illness:  Mr. Sang Koroma is a 43 year old male who presents for follow up for genital warts. He was last seen on 5/9/19 when the genital warts were treated with cryo. Today he reports that one of the warts on the scrotum has increased in size and is very painful. Has not spread.     Past Medical History:   Patient Active Problem List   Diagnosis     Major depressive disorder in full remission (H)     Medial tibial stress syndrome     Elevated serum creatinine     Gout involving toe of right foot     Herpes simplex infection of genitourinary system     Nonintractable episodic headache     No past medical history on file.  No past surgical history on file.    Social History:  The patient works as an  and he works on his feet. The patient denies use of tanning beds.  Kept in chart for convenience.     Family History:  There is no family history of skin cancer.  Kept in chart for convenience.       Medications:  Current Outpatient Medications   Medication Sig Dispense Refill     acyclovir (ZOVIRAX) 400 MG tablet Take 1 tablet (400 mg) by mouth 2 times daily 180 tablet 3     fluorouracil (EFUDEX) 5 % cream Apply twice daily for 2-3 weeks or until irritation, then stop 40 g 1     indomethacin (INDOCIN) 25 MG capsule Take 1 capsule (25 mg) by mouth 3 times daily (with meals) 42 capsule 1       No Known Allergies    Review of Systems:  Skin: as per HPI.  Constitutional: Pt is generally feeling well.     Physical exam:  Vitals: There were  no vitals taken for this visit.  GEN: This is a well developed, well-nourished male in no acute distress, in a pleasant mood.    SKIN: Focused examination of the genital area was performed.   - Skin colored flat paulpes at base of penis and scrotum and Mons pubis   - 1 verrucous papule on the right mons pubis  - 1 verrucous papule on the right base of the penis     Impression/Plan:  1. Genital Warts - Improved, Schedule for HPV vaccine, shave remove 2 larger lesions today, continue cryo, failed topicals(aldara and efudex)    Cryotherapy procedure note: After verbal consent and discussion of risks and benefits including but no limited to blister, and pain, 25 were treated on the base of penis , mons pubis, scrotum and right groin with 1-2mm freeze border for 2 cycles with liquid nitrogen. Post cryotherapy instructions were provided.      Failed Efudex     Start Pedofilex       Shave removal:  After discussion of benefits and risks including but not limited to bleeding/bruising, pain/swelling, infection, scar, incomplete removal, nerve damage/numbness, recurrence, and non-diagnostic biopsy, written consent, verbal consent and photographs were obtained. Time-out was performed. The area was cleaned with isopropyl alcohol. 0.5mL of 1% lidocaine with epinephrine was injected to obtain adequate anesthesia of the lesion on the right mons pubis. A shave removal was performed. Hemostasis was achieved with aluminium chloride. Base of the lesion was treated with cryotherapy. Vaseline and guaze were applied. The patient tolerated the procedure and no complications were noted. The patient was provided with verbal and written post care instructions.       Shave removal:  After discussion of benefits and risks including but not limited to bleeding/bruising, pain/swelling, infection, scar, incomplete removal, nerve damage/numbness, recurrence, and non-diagnostic biopsy, written consent, verbal consent and photographs were obtained.  Time-out was performed. The area was cleaned with isopropyl alcohol. 0.5mL of 1% lidocaine with epinephrine was injected to obtain adequate anesthesia of the lesion on the right base of the penis. A shave removal was performed. Hemostasis was achieved with aluminium chloride. Base of the lesion was treated with cryotherapy. Vaseline and gauze were applied. The patient tolerated the procedure and no complications were noted. The patient was provided with verbal and written post care instructions.       Follow-up in 3 weeks for cryotherapy, earlier for new or changing lesions. For future cryo, Pt would like to be scheduled with Dr. Rg.     Staff Involved:  Scribe/Staff    Scribe Disclosure  I, Antoinette English, am serving as a scribe to document services personally performed by Dr. Shasha Simental MD, based on data collection and the provider's statements to me.     Provider Disclosure:   The documentation recorded by the scribe accurately reflects the services I personally performed and the decisions made by me.    Shasha Simental MD    Department of Dermatology  [facility]: Phone: [phone], Fax:[phone]  [facility]: Phone: [phone], Fax: [phone]

## 2019-06-28 NOTE — NURSING NOTE
Sang Koroma's goals for this visit include:   Chief Complaint   Patient presents with     Billykathy     Dmitry is returning to discuss genital warts; one has increased in size and is painful     He requests these members of his care team be copied on today's visit information:     PCP: Mian Hunt    Referring Provider:  No referring provider defined for this encounter.    There were no vitals taken for this visit.    Do you need any medication refills at today's visit? No

## 2019-06-28 NOTE — PATIENT INSTRUCTIONS
Gardasil Vaccine Schedule  1st dose  Today  2nd dose 2 months from 1st dose  3rd dose 6 months from 1st dose

## 2019-06-28 NOTE — PROGRESS NOTES
Sang Koroma comes into clinic today at the request of Dr. Shasha Simental Ordering Provider for Gardasil9 vaccine.    Patient verified he has checked with insurance regarding coverage of vaccine prior to administration and states he was advised that this is covered under his insurance plan.    Screening Questionnaire for Adult Immunization    Are you sick today?   No   Do you have allergies to medications, food, a vaccine component or latex?   No   Have you ever had a serious reaction after receiving a vaccination?   No   Do you have a long-term health problem with heart disease, lung disease, asthma, kidney disease, metabolic disease (e.g. diabetes), anemia, or other blood disorder?   No   Do you have cancer, leukemia, HIV/AIDS, or any other immune system problem?   No   In the past 3 months, have you taken medications that affect  your immune system, such as prednisone, other steroids, or anticancer drugs; drugs for the treatment of rheumatoid arthritis, Crohn s disease, or psoriasis; or have you had radiation treatments?   No   Have you had a seizure, or a brain or other nervous system problem?   No   During the past year, have you received a transfusion of blood or blood     products, or been given immune (gamma) globulin or antiviral drug?   No   For women: Are you pregnant or is there a chance you could become        pregnant during the next month?   No   Have you received any vaccinations in the past 4 weeks?   No     Immunization questionnaire answers were all negative.        Per orders of Dr. Shasha Simental, injection of Gardasil9 given by Maureen Betancourt. Patient instructed to remain in clinic for 15 minutes afterwards, and to report any adverse reaction to me immediately.       Screening performed by Maureen Betancourt on 6/28/2019 at 2:20 PM.      This service provided today was under the supervising provider of the day Dr. José, who was available if needed.    Maureen Betancourt, CMA

## 2019-07-03 ENCOUNTER — TELEPHONE (OUTPATIENT)
Dept: DERMATOLOGY | Facility: CLINIC | Age: 44
End: 2019-07-03

## 2019-07-03 LAB — COPATH REPORT: NORMAL

## 2019-07-03 RX ORDER — VALACYCLOVIR HYDROCHLORIDE 500 MG/1
500 TABLET, FILM COATED ORAL 2 TIMES DAILY
Qty: 14 TABLET | Refills: 0 | Status: SHIPPED | OUTPATIENT
Start: 2019-07-03 | End: 2020-02-05

## 2019-07-03 NOTE — TELEPHONE ENCOUNTER
Per  pt has an active herpes virus and needs to start Valtrex asap. Medication sent to the pharmacy.  also advised if new lesions develop or worsening sx develop to visit the [place] [facility]. Resident on call number to be given to pt per .  Pt advised to call the clinic back with any questions [phone]...Vaishali Aguila RN      For urgent needs outside of business hours call the [facility] at [phone] and ask for the dermatology resident on call

## 2019-07-03 NOTE — LETTER
Mr.Joseph Filiberto Koroma  [address] [facility] [address]    July 9, 2019    Dear ,      [facility]        PATIENT NAME: Sang Koroma  DATE: July 9, 2019   MRN: [id number]     To Whom This May Concern,     We are writing to notify you the biopsy showed a condyloma and an active herpes infection. We could not reach you by phone.            Please call us as we have tried to reach you by phone to start treatment for the herpes.         We strive to provide our patient with outstanding care. Therefore, I request you please contact me at the numbers below..      Sincerely,       Shasha Simental MD    Department of Dermatology  [facility]: Phone: [phone], Fax:[phone]  [facility]: Phone: [phone], Fax: [phone]

## 2019-07-05 NOTE — TELEPHONE ENCOUNTER
Pt called to inquire if he received earlier messages, no answer. VM Id's pt. Left detailed message with reason for call and  for pt to call the [facility] back at [phone]....Vaishali Aguila RN

## 2019-07-08 NOTE — TELEPHONE ENCOUNTER
I left a message for patient to call [facility].    Three attempts to reach patient.  Forwarding to MD to write letter.  Ana Rosa Calvert RN

## 2019-07-09 NOTE — TELEPHONE ENCOUNTER
[facility]       PATIENT NAME: Sang Koroma  DATE: July 9, 2019   MRN: [id number]    To Whom This May Concern,    We are writing to notify you the biopsy showed a condyloma and an active herpes infection. We could not reach you by phone.          Please call us as we have tried to reach you by phone to start treatment for the herpes.       We strive to provide our patient with outstanding care. Therefore, I request you please contact me at the numbers below..     Sincerely,      Shasha Simental MD    Department of Dermatology  [facility]: Phone: [phone], Fax:[phone]  [facility]: Phone: [phone], Fax: [phone]

## 2019-07-17 ENCOUNTER — TELEPHONE (OUTPATIENT)
Dept: DERMATOLOGY | Facility: CLINIC | Age: 44
End: 2019-07-17

## 2019-07-17 NOTE — TELEPHONE ENCOUNTER
[facility] Call Center    Phone Message    May a detailed message be left on voicemail: yes    Reason for Call: Other: Patient would like to reschedule appt for 07/19/2019. Please advise.     Action Taken: Message routed to:  Adult Clinics: Dermatology p [id number]

## 2019-08-09 ENCOUNTER — OFFICE VISIT (OUTPATIENT)
Dept: DERMATOLOGY | Facility: CLINIC | Age: 44
End: 2019-08-09
Payer: COMMERCIAL

## 2019-08-09 DIAGNOSIS — A63.0 GENITAL WARTS: Primary | ICD-10-CM

## 2019-08-09 PROCEDURE — 17110 DESTRUCTION B9 LES UP TO 14: CPT | Performed by: DERMATOLOGY

## 2019-08-09 ASSESSMENT — PAIN SCALES - GENERAL: PAINLEVEL: NO PAIN (0)

## 2019-08-09 NOTE — NURSING NOTE
Sang Koroma's goals for this visit include:   Chief Complaint   Patient presents with     Steven Andres is visiting for wart treatment; improvement has been noted since his last office visit.     He requests these members of his care team be copied on today's visit information:     PCP: Mian Hunt    Referring Provider:  No referring provider defined for this encounter.    There were no vitals taken for this visit.    Do you need any medication refills at today's visit? No    Chloe Yadav LPN

## 2019-08-09 NOTE — PATIENT INSTRUCTIONS
Cryotherapy    What is it?    Use of a very cold liquid, such as liquid nitrogen, to freeze and destroy abnormal skin cells that need to be removed    What should I expect?    Tenderness and redness    A small blister that might grow and fill with dark purple blood. There may be crusting.    More than one treatment may be needed if the lesions do not go away.    How do I care for the treated area?    Gently wash the area with your hands when bathing.    Use a thin layer of Vaseline to help with healing. You may use a Band-Aid.     The area should heal within 7-10 days and may leave behind a pink or lighter color.     Do not use an antibiotic or Neosporin ointment.     You may take acetaminophen (Tylenol) for pain.     Call your Doctor if you have:    Severe pain    Signs of infection (warmth, redness, cloudy yellow drainage, and or a bad smell)    Questions or concerns    Who should I call with questions?       [facility]: [phone]       [facility]: [phone]       For urgent needs outside of business hours call the [facility] at [phone]        and ask for the dermatology resident on call

## 2019-08-09 NOTE — PROGRESS NOTES
[facility] Dermatology Note      Dermatology Problem List:  1. 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 HSV positive per patient    Encounter Date: Aug 9, 2019    CC:  Chief Complaint   Patient presents with     Wart     Dmitry is visiting for wart treatment; improvement has been noted since his last office visit.         History of Present Illness:  Mr. Sang Koroma is a 43 year old male who presents for follow up for genital warts. He was last seen on 6/28/19 when 25 genital warts were treated with cryo and 2 were removed. Today he reports improvement since his last office visit. He is aware of HSV history.       Past Medical History:   Patient Active Problem List   Diagnosis     Major depressive disorder in full remission (H)     Medial tibial stress syndrome     Elevated serum creatinine     Gout involving toe of right foot     Herpes simplex infection of genitourinary system     Nonintractable episodic headache     No past medical history on file.  No past surgical history on file.    Social History:  The patient works as an  and he works on his feet. The patient denies use of tanning beds.  Kept in chart for convenience.     Family History:  There is no family history of skin cancer.  Kept in chart for convenience.       Medications:  Current Outpatient Medications   Medication Sig Dispense Refill     acyclovir (ZOVIRAX) 400 MG tablet Take 1 tablet (400 mg) by mouth 2 times daily 180 tablet 3     indomethacin (INDOCIN) 25 MG capsule Take 1 capsule (25 mg) by mouth 3 times daily (with meals) 42 capsule 1     valACYclovir (VALTREX) 500 MG tablet Take 1 tablet (500 mg) by mouth 2 times daily for 3 days 14 tablet  0       No Known Allergies    Review of Systems:  Skin: as per HPI.  Constitutional: Pt is generally feeling well.     Physical exam:  Vitals: There were no vitals taken for this visit.  GEN: This is a well developed, well-nourished male in no acute distress, in a pleasant mood.    SKIN: Focused examination of the genital area was performed.   - Skin colored flat paulpes at the right mons pubis, right scrotum, base of penis, right inguinal crease, verrucous      Impression/Plan:  1. Genital Warts - Much improved, continue cryo, failed topicals(aldara and efudex). He definitively better today    Cryotherapy procedure note: After verbal consent and discussion of risks and benefits including but no limited to blister, and pain, 13 were treated on the base of penis , mons pubis, scrotum and right groin with 1-2mm freeze border for 2 cycles with liquid nitrogen. Post cryotherapy instructions were provided.      Not using podophyllin         Follow-up in 3 weeks for cryotherapy, earlier for new or changing lesions. For future cryo, Pt would like to be scheduled with Dr. Rg.     Staff Involved:  Scribe/Staff    Scribe Disclosure  I, Antoinette English, am serving as a scribe to document services personally performed by Dr. Shasha Simental MD, based on data collection and the provider's statements to me.     Provider Disclosure:   The documentation recorded by the scribe accurately reflects the services I personally performed and the decisions made by me.    Shasha Simental MD    Department of Dermatology  [facility]: Phone: [phone], Fax:[phone]  [facility]: Phone: [phone], Fax: [phone]

## 2019-08-09 NOTE — LETTER
8/9/2019         RE: Sang Koroma  [address] [facility] [address]        Dear Colleague,    Thank you for referring your patient, Sang Koroma, to the [facility]. Please see a copy of my visit note below.    [facility] Dermatology Note      Dermatology Problem List:  1. 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 HSV positive per patient    Encounter Date: Aug 9, 2019    CC:  Chief Complaint   Patient presents with     Wart     Dmitry is visiting for wart treatment; improvement has been noted since his last office visit.         History of Present Illness:  Mr. Sang Koroma is a 43 year old male who presents for follow up for genital warts. He was last seen on 6/28/19 when 25 genital warts were treated with cryo and 2 were removed. Today he reports improvement since his last office visit. He is aware of HSV history.       Past Medical History:   Patient Active Problem List   Diagnosis     Major depressive disorder in full remission (H)     Medial tibial stress syndrome     Elevated serum creatinine     Gout involving toe of right foot     Herpes simplex infection of genitourinary system     Nonintractable episodic headache     No past medical history on file.  No past surgical history on file.    Social History:  The patient works as an  and he works on his feet. The patient denies use of tanning beds.  Kept in chart for convenience.     Family History:  There is no family history of skin cancer.  Kept in chart for convenience.       Medications:  Current Outpatient Medications   Medication Sig Dispense Refill     acyclovir (ZOVIRAX) 400 MG tablet Take 1 tablet (400  mg) by mouth 2 times daily 180 tablet 3     indomethacin (INDOCIN) 25 MG capsule Take 1 capsule (25 mg) by mouth 3 times daily (with meals) 42 capsule 1     valACYclovir (VALTREX) 500 MG tablet Take 1 tablet (500 mg) by mouth 2 times daily for 3 days 14 tablet 0       No Known Allergies    Review of Systems:  Skin: as per HPI.  Constitutional: Pt is generally feeling well.     Physical exam:  Vitals: There were no vitals taken for this visit.  GEN: This is a well developed, well-nourished male in no acute distress, in a pleasant mood.    SKIN: Focused examination of the genital area was performed.   - Skin colored flat paulpes at the right mons pubis, right scrotum, base of penis, right inguinal crease, verrucous      Impression/Plan:  1. Genital Warts - Much improved, continue cryo, failed topicals(aldara and efudex). He definitively better today    Cryotherapy procedure note: After verbal consent and discussion of risks and benefits including but no limited to blister, and pain, 13 were treated on the base of penis , mons pubis, scrotum and right groin with 1-2mm freeze border for 2 cycles with liquid nitrogen. Post cryotherapy instructions were provided.      Not using podophyllin         Follow-up in 3 weeks for cryotherapy, earlier for new or changing lesions. For future cryo, Pt would like to be scheduled with Dr. Rg.     Staff Involved:  Scribe/Staff    Scribe Disclosure  I, Antoinette English, am serving as a scribe to document services personally performed by Dr. Shasha Simental MD, based on data collection and the provider's statements to me.     Provider Disclosure:   The documentation recorded by the scribe accurately reflects the services I personally performed and the decisions made by me.    Shasha Simental MD    Department of Dermatology  [facility]: Phone: [phone], Fax:[phone]  [facility]  [facility]: Phone: [phone], Fax: [phone]        Again, thank you for allowing me to participate in the care of your patient.        Sincerely,        Shasha Simental MD

## 2019-08-27 ENCOUNTER — TELEPHONE (OUTPATIENT)
Dept: DERMATOLOGY | Facility: CLINIC | Age: 44
End: 2019-08-27

## 2019-08-27 NOTE — TELEPHONE ENCOUNTER
Writer called and spoke with patient. Patient added to Dr. Simental's schedule on 9/10 at 2pm.    Marium Shah LPN

## 2019-08-27 NOTE — TELEPHONE ENCOUNTER
Health Call Center    Phone Message    May a detailed message be left on voicemail: yes    Reason for Call: Patient request a call back from care team to reschedule his appointment. Please call patient back to advise,      Action Taken: Message routed to:  Adult Clinics: Dermatology p [id number]

## 2019-08-28 ENCOUNTER — ALLIED HEALTH/NURSE VISIT (OUTPATIENT)
Dept: PEDIATRICS | Facility: CLINIC | Age: 44
End: 2019-08-28
Payer: COMMERCIAL

## 2019-08-28 DIAGNOSIS — Z23 NEED FOR VACCINATION: Primary | ICD-10-CM

## 2019-08-28 PROCEDURE — 90471 IMMUNIZATION ADMIN: CPT

## 2019-08-28 PROCEDURE — 90651 9VHPV VACCINE 2/3 DOSE IM: CPT

## 2019-08-28 PROCEDURE — 99207 ZZC NO CHARGE NURSE ONLY: CPT

## 2019-08-28 NOTE — PROGRESS NOTES
Sang Koroma comes into clinic today at the request of Dr. Simental Ordering Provider for vaccination.    Prior to immunization administration, verified patients identity using patient s name and date of birth. Please see Immunization Activity for additional information.     Screening Questionnaire for Adult Immunization    Are you sick today?   No   Do you have allergies to medications, food, a vaccine component or latex?   No   Have you ever had a serious reaction after receiving a vaccination?   No   Do you have a long-term health problem with heart disease, lung disease, asthma, kidney disease, metabolic disease (e.g. diabetes), anemia, or other blood disorder?   No   Do you have cancer, leukemia, HIV/AIDS, or any other immune system problem?   No   In the past 3 months, have you taken medications that affect  your immune system, such as prednisone, other steroids, or anticancer drugs; drugs for the treatment of rheumatoid arthritis, Crohn s disease, or psoriasis; or have you had radiation treatments?   No   Have you had a seizure, or a brain or other nervous system problem?   No   During the past year, have you received a transfusion of blood or blood     products, or been given immune (gamma) globulin or antiviral drug?   No   For women: Are you pregnant or is there a chance you could become        pregnant during the next month?   No   Have you received any vaccinations in the past 4 weeks?   No     Immunization questionnaire answers were all negative.        Per orders of Dr. Simental, injection of Gardasil9 #2 given by Maureen Betnacourt CMA. Patient instructed to remain in clinic for 15 minutes afterwards, and to report any adverse reaction to me immediately.       Screening performed by Maureen Betancourt CMA on 8/28/2019 at 3:45 PM.    Gardasil Vaccine Schedule  1st dose          6/28/19  2nd dose         Today  3rd dose          6 months from 1st dose    This service provided today was under the  supervising provider of the day Dr. Martinez, who was available if needed.    Maureen Betancourt, CMA

## 2019-09-10 ENCOUNTER — OFFICE VISIT (OUTPATIENT)
Dept: DERMATOLOGY | Facility: CLINIC | Age: 44
End: 2019-09-10
Payer: COMMERCIAL

## 2019-09-10 DIAGNOSIS — A63.0 GENITAL WARTS: Primary | ICD-10-CM

## 2019-09-10 PROCEDURE — 17110 DESTRUCTION B9 LES UP TO 14: CPT | Performed by: DERMATOLOGY

## 2019-09-10 NOTE — PROGRESS NOTES
[facility] Dermatology Note      Dermatology Problem List:  1. 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 imiquimod  2. HSV positive per patient    Encounter Date: Sep 10, 2019    CC:  Chief Complaint   Patient presents with     RECHECK     warts          History of Present Illness:  Mr. Sang Koroma is a 43 year old male who presents for follow up for genital warts. He was last seen on 8/9/2019 when 13 genital warts were treated with cryo. Today he reports the warts are almost gone. He would like to continue with cryotherapy treatment today.    No other concerns.    Past Medical History:   Patient Active Problem List   Diagnosis     Major depressive disorder in full remission (H)     Medial tibial stress syndrome     Elevated serum creatinine     Gout involving toe of right foot     Herpes simplex infection of genitourinary system     Nonintractable episodic headache     No past medical history on file.  No past surgical history on file.    Social History:  The patient works as an  and he works on his feet. The patient denies use of tanning beds.  Kept in chart for convenience.     Family History:  There is no family history of skin cancer.  Kept in chart for convenience.       Medications:  Current Outpatient Medications   Medication Sig Dispense Refill     acyclovir (ZOVIRAX) 400 MG tablet Take 1 tablet (400 mg) by mouth 2 times daily 180 tablet 3     indomethacin (INDOCIN) 25 MG capsule Take 1 capsule (25 mg) by mouth 3 times daily (with meals) 42 capsule 1     valACYclovir (VALTREX) 500 MG tablet Take 1 tablet (500 mg) by mouth 2 times daily for 3 days 14 tablet 0       No Known Allergies    Review of Systems:  Skin: as per HPI.  Constitutional: Pt is generally feeling well.      Physical exam:  Vitals: There were no vitals taken for this visit.  GEN: This is a well developed, well-nourished male in no acute distress, in a pleasant mood.    SKIN: Focused examination of the genital area was performed.   - Skin colored flat paulpes at the right scrotum, right base of penis, right ventral penis  - No other lesions of concern on areas examined.      Impression/Plan:  1. Genital Warts - dramatically improved, nearly clear. Right scrotum, right base of penis, right ventral penis.    Cryotherapy procedure note: After verbal consent and discussion of risks and benefits including but no limited to blister, and pain, 12 were treated on the base of penis , mons pubis, scrotum and right groin with 1-2mm freeze border for 2 cycles with liquid nitrogen. Post cryotherapy instructions were provided.    Has 3rd injection of HPV    Follow-up in 3 weeks for cryotherapy with Sophy Gil PA-C, devora for new or changing lesions.    Staff Involved:  Scribe/Staff    Scribe Disclosure  I, Mariela Bullock, am serving as a scribe to document services personally performed by Dr. Shasha Simental MD, based on data collection and the provider's statements to me.    Provider Disclosure:   The documentation recorded by the scribe accurately reflects the services I personally performed and the decisions made by me.    Shasha Simental MD    Department of Dermatology  [facility]: Phone: [phone], Fax:[phone]  [facility]: Phone: [phone], Fax: [phone]

## 2019-09-10 NOTE — LETTER
9/10/2019         RE: Sang Koroma  [address] [facility] [address]        Dear Colleague,    Thank you for referring your patient, Sang Koroma, to the [facility]. Please see a copy of my visit note below.    [facility] Dermatology Note      Dermatology Problem List:  1. 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 imiquimod  2. HSV positive per patient    Encounter Date: Sep 10, 2019    CC:  Chief Complaint   Patient presents with     RECHECK     warts          History of Present Illness:  Mr. Sang Koroma is a 43 year old male who presents for follow up for genital warts. He was last seen on 8/9/2019 when 13 genital warts were treated with cryo. Today he reports the warts are almost gone. He would like to continue with cryotherapy treatment today.    No other concerns.    Past Medical History:   Patient Active Problem List   Diagnosis     Major depressive disorder in full remission (H)     Medial tibial stress syndrome     Elevated serum creatinine     Gout involving toe of right foot     Herpes simplex infection of genitourinary system     Nonintractable episodic headache     No past medical history on file.  No past surgical history on file.    Social History:  The patient works as an  and he works on his feet. The patient denies use of tanning beds.  Kept in chart for convenience.     Family History:  There is no family history of skin cancer.  Kept in chart for convenience.       Medications:  Current Outpatient Medications   Medication Sig Dispense Refill     acyclovir (ZOVIRAX) 400 MG tablet Take 1 tablet (400 mg) by mouth 2 times daily 180 tablet 3     indomethacin (INDOCIN) 25 MG capsule Take 1 capsule (25 mg) by mouth 3  times daily (with meals) 42 capsule 1     valACYclovir (VALTREX) 500 MG tablet Take 1 tablet (500 mg) by mouth 2 times daily for 3 days 14 tablet 0       No Known Allergies    Review of Systems:  Skin: as per HPI.  Constitutional: Pt is generally feeling well.     Physical exam:  Vitals: There were no vitals taken for this visit.  GEN: This is a well developed, well-nourished male in no acute distress, in a pleasant mood.    SKIN: Focused examination of the genital area was performed.   - Skin colored flat paulpes at the right scrotum, right base of penis, right ventral penis  - No other lesions of concern on areas examined.      Impression/Plan:  1. Genital Warts - dramatically improved, nearly clear. Right scrotum, right base of penis, right ventral penis.    Cryotherapy procedure note: After verbal consent and discussion of risks and benefits including but no limited to blister, and pain, 12 were treated on the base of penis , mons pubis, scrotum and right groin with 1-2mm freeze border for 2 cycles with liquid nitrogen. Post cryotherapy instructions were provided.    Has 3rd injection of HPV    Follow-up in 3 weeks for cryotherapy with Sophy Gil PA-C, devora for new or changing lesions.    Staff Involved:  Scribe/Staff    Scribe Disclosure  I, Mariela Bullock, am serving as a scribe to document services personally performed by Dr. Shasha Simental MD, based on data collection and the provider's statements to me.    Provider Disclosure:   The documentation recorded by the scribe accurately reflects the services I personally performed and the decisions made by me.    Shasha Simental MD    Department of Dermatology  [facility] [place][note 2]: Phone: [phone], Fax:[phone]  [facility]: Phone: [phone], Fax: [phone]          Again, thank you for allowing me to participate in the care of your  patient.        Sincerely,        Shasha Simental MD

## 2019-09-10 NOTE — PATIENT INSTRUCTIONS
Cryotherapy    What is it?    Use of a very cold liquid, such as liquid nitrogen, to freeze and destroy abnormal skin cells that need to be removed    What should I expect?    Tenderness and redness    A small blister that might grow and fill with dark purple blood. There may be crusting.    More than one treatment may be needed if the lesions do not go away.    How do I care for the treated area?    Gently wash the area with your hands when bathing.    Use a thin layer of Vaseline to help with healing. You may use a Band-Aid.     The area should heal within 7-10 days and may leave behind a pink or lighter color.     Do not use an antibiotic or Neosporin ointment.     You may take acetaminophen (Tylenol) for pain.     Call your Doctor if you have:    Severe pain    Signs of infection (warmth, redness, cloudy yellow drainage, and or a bad smell)    Questions or concerns    Who should I call with questions?       [facility]: [phone]       [facility]: [phone]       For urgent needs outside of business hours call the [facility] at [phone]        and ask for the dermatology resident on call

## 2019-09-10 NOTE — NURSING NOTE
Sang Koroma's goals for this visit include:   Chief Complaint   Patient presents with     RECHECK     warts        He requests these members of his care team be copied on today's visit information: yes    PCP: Mian Hunt    Referring Provider:  No referring provider defined for this encounter.    There were no vitals taken for this visit.    Do you need any medication refills at today's visit? No     Amorrjosué Meneses CMA

## 2019-10-07 ENCOUNTER — TELEPHONE (OUTPATIENT)
Dept: DERMATOLOGY | Facility: CLINIC | Age: 44
End: 2019-10-07

## 2019-10-07 NOTE — TELEPHONE ENCOUNTER
"Writer left a voice message on Sang Koroma's phone to call back to discuss scheduling with Nanda Gil on Wednesday October 9th 2019. Clinic number provided to call back.    Per Dr. Simental, \"Follow-up in 3 weeks for cryotherapy with Sophy Gil PA-C, earlier for new or changing lesions.\"    Chloe Yadav LPN    "

## 2019-10-07 NOTE — TELEPHONE ENCOUNTER
----- Message from Shasha Simental MD sent at 9/10/2019  2:56 PM CDT -----  Regarding: schedule with PA (Jocelyn Glass for cryo Q2 weeks)  Please call an schedule when Dariusz schedule

## 2019-10-29 ENCOUNTER — OFFICE VISIT (OUTPATIENT)
Dept: FAMILY MEDICINE | Facility: OTHER | Age: 44
End: 2019-10-29
Payer: COMMERCIAL

## 2019-10-29 VITALS
TEMPERATURE: 98.4 F | SYSTOLIC BLOOD PRESSURE: 130 MMHG | OXYGEN SATURATION: 97 % | BODY MASS INDEX: 33.65 KG/M2 | HEIGHT: 67 IN | HEART RATE: 76 BPM | RESPIRATION RATE: 16 BRPM | DIASTOLIC BLOOD PRESSURE: 76 MMHG | WEIGHT: 214.4 LBS

## 2019-10-29 DIAGNOSIS — J34.89 NASAL MASS: Primary | ICD-10-CM

## 2019-10-29 DIAGNOSIS — Z23 NEED FOR PROPHYLACTIC VACCINATION AND INOCULATION AGAINST INFLUENZA: ICD-10-CM

## 2019-10-29 PROCEDURE — 90471 IMMUNIZATION ADMIN: CPT | Performed by: PHYSICIAN ASSISTANT

## 2019-10-29 PROCEDURE — 90686 IIV4 VACC NO PRSV 0.5 ML IM: CPT | Performed by: PHYSICIAN ASSISTANT

## 2019-10-29 PROCEDURE — 99213 OFFICE O/P EST LOW 20 MIN: CPT | Mod: 25 | Performed by: PHYSICIAN ASSISTANT

## 2019-10-29 ASSESSMENT — MIFFLIN-ST. JEOR: SCORE: 1833.14

## 2019-10-29 NOTE — PROGRESS NOTES
Subjective     Sang Koroma is a 44 year old male who presents to clinic today for the following health issues:    HPI   Concern - Right nasal growth  Onset: maybe a couple of months - growing in size     Description:   -Not painful. It can get crusty and he picks at it (and then it bleeds). The mass occludes his right nare.     -Patient denies any nasal trauma. He denies picking his nose frequently.   -He does state that he has had an increase in nose bleeds and bloody drainage since this developed.   -He denies a history of seasonal allergies but does endorse chronic PND.   -He denies fevers, chills, hearing changes, vision changes, and sore throat.     Patient Active Problem List   Diagnosis     Major depressive disorder in full remission (H)     Medial tibial stress syndrome     Elevated serum creatinine     Gout involving toe of right foot     Herpes simplex infection of genitourinary system     Nonintractable episodic headache     History reviewed. No pertinent surgical history.    Social History     Tobacco Use     Smoking status: Never Smoker     Smokeless tobacco: Never Used   Substance Use Topics     Alcohol use: Yes     Comment: occ     Family History   Problem Relation Age of Onset     Cancer Maternal Grandfather      Cancer Paternal Grandmother      Cancer Paternal Grandfather      Gout Brother      Crohn's Disease Brother          Current Outpatient Medications   Medication Sig Dispense Refill     acyclovir (ZOVIRAX) 400 MG tablet Take 1 tablet (400 mg) by mouth 2 times daily 180 tablet 3     indomethacin (INDOCIN) 25 MG capsule Take 1 capsule (25 mg) by mouth 3 times daily (with meals) (Patient not taking: Reported on 10/29/2019) 42 capsule 1     valACYclovir (VALTREX) 500 MG tablet Take 1 tablet (500 mg) by mouth 2 times daily for 3 days 14 tablet 0     No Known Allergies    Reviewed and updated as needed this visit by Provider         Review of Systems   ROS COMP: Constitutional, HEENT,  "cardiovascular, pulmonary, skin, and psych systems are negative, except as otherwise noted.      Objective    /76   Pulse 76   Temp 98.4  F (36.9  C) (Temporal)   Resp 16   Ht 1.705 m (5' 7.13\")   Wt 97.3 kg (214 lb 6.4 oz)   SpO2 97%   BMI 33.45 kg/m    Body mass index is 33.45 kg/m .  Physical Exam   GENERAL: healthy, alert and no distress  EYES: Eyes normal to inspection, PERRL and conjunctivae and sclerae normal  HENT: ear canals and TM's normal, right nare - edematous, mildly erythematous, irregularly shaped, pedunculated mass with slight ulceration located in the anterior and superior nasal cavity, left nare - normal without edematous nasal mucosa or lesions, mildly erythematous posterior oropharynx with cobblestoning, no lesions or exudate, tonsils non visible, uvula midline   NECK: no adenopathy, no asymmetry, masses, or scars and thyroid normal to palpation  RESP: lungs clear to auscultation - no rales, rhonchi or wheezes  CV: regular rate and rhythm, normal S1 S2, no S3 or S4, no murmur, click or rub  NEURO: mentation intact and speech normal  PSYCH: mentation appears normal, affect normal/bright    Diagnostic Test Results:  Labs reviewed in Epic  none         Assessment & Plan       ICD-10-CM    1. Nasal mass J34.89 OTOLARYNGOLOGY REFERRAL   2. Need for prophylactic vaccination and inoculation against influenza Z23 INFLUENZA VACCINE IM > 6 MONTHS VALENT IIV4 [72347]     Vaccine Administration, Initial [43582]     1. The following differentials were considered in this patient: benign inflammatory nasal polyp versus malignancy. The patient also endorses a history of chronic post nasal drip, which may be related to seasonal allergies. Thus, recommend that he try OTC flonase daily and see if this improves the size of his mass and also his PND. However, given that his mass was irregularly shaped and ulcerated, recommend that he follow up with ENT as well to rule out malignancy. Instructed patient " to notify clinic if his mass continues to grow in the meantime or if he has more frequent bleeding episodes.      2. Administered flu vaccine today.      Return for ENT evaluation, sooner if mass grows or is bleeding more frequently.    Patient was seen with CORBIN Lord    I, Delfino Moya PA-C, was present with the Physician Assistant student who participated in the service and in the documentation of the note.  I have verified the history and personally performed the physical exam and medical decision making.  I agree with the assessment and plan of care as documented in the note.     Options for treatment and follow-up care were reviewed with the patient and/or guardian. Patient and/or guardian engaged in the decision making process and verbalized understanding of the options discussed and agreed with the final plan.    Delfino Moya PA-C  [facility]

## 2019-10-29 NOTE — PATIENT INSTRUCTIONS
Nasal Steroid: Fluticasone/Flonase or Nasacort, These are OTC medications for allergies.  They are steroid sprays that are localized to the nose so no systemic effects. Two sprays each nostril once daily - allergist noted it is most effective if used before bed.  Ok to continue to use nasal saline as well.  When you spray it in the nose it should be up and out towards the eye on the side you are spraying the medication.  You should not taste the medication and it should not drip out the nose. This will decrease inflammation and also nasal mucous production.  You can also use this anytime you are developing nasal congestion.  Ok in the future to start these as soon as you feel you are developing nasal congestion, sinus pressure, increased nasal drainage.

## 2019-10-30 ENCOUNTER — OFFICE VISIT (OUTPATIENT)
Dept: DERMATOLOGY | Facility: CLINIC | Age: 44
End: 2019-10-30
Payer: COMMERCIAL

## 2019-10-30 DIAGNOSIS — A63.0 CONDYLOMA: Primary | ICD-10-CM

## 2019-10-30 PROCEDURE — 17110 DESTRUCTION B9 LES UP TO 14: CPT | Performed by: PHYSICIAN ASSISTANT

## 2019-10-30 NOTE — PROGRESS NOTES
[facility] Dermatology Note      Dermatology Problem List:  1. 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 imiquimod  2. HSV positive per patient    Encounter Date: Oct 30, 2019    CC:  Chief Complaint   Patient presents with     RECHECK     genital warts         History of Present Illness:  Mr. Sang Koroma is a 44 year old male who presents as a follow-up for genital warts. The patient was last seen on 09/10/19 by Dr. Simental when cryotherapy was administered to 12 warts on the base of the penis, mons pubis, scrotum and right groin.     At today's visit, the patient notes he has not noticed any new warts since the last visit. He states that he has not been using topical medication on his warts because they would worsen his warts. He notes that he has had 2/3 shots for HPV and will be having the 3rd in December. The patient denies painful, itching, tingling or bleeding lesions unless otherwise noted.    Past Medical History:   Patient Active Problem List   Diagnosis     Major depressive disorder in full remission (H)     Medial tibial stress syndrome     Elevated serum creatinine     Gout involving toe of right foot     Herpes simplex infection of genitourinary system     Nonintractable episodic headache     No past medical history on file.  No past surgical history on file.    Social History:   reports that he has never smoked. He has never used smokeless tobacco. He reports current alcohol use. He reports that he does not use drugs.    Family History:  Family History   Problem Relation Age of Onset     Cancer Maternal Grandfather      Cancer Paternal Grandmother      Cancer Paternal Grandfather      Gout Brother      Crohn's Disease Brother        Medications:  Current Outpatient  Medications   Medication Sig Dispense Refill     acyclovir (ZOVIRAX) 400 MG tablet Take 1 tablet (400 mg) by mouth 2 times daily 180 tablet 3     indomethacin (INDOCIN) 25 MG capsule Take 1 capsule (25 mg) by mouth 3 times daily (with meals) (Patient not taking: Reported on 10/30/2019) 42 capsule 1     valACYclovir (VALTREX) 500 MG tablet Take 1 tablet (500 mg) by mouth 2 times daily for 3 days 14 tablet 0       No Known Allergies    Review of Systems:  -Constitutional: The patient denies fatigue, fevers, chills, unintended weight loss, and night sweats.  -HEENT: Patient denies nonhealing oral sores.  -Skin: As above in HPI. No additional skin concerns.    Physical exam:  Vitals: There were no vitals taken for this visit.  GEN: This is a well developed, well-nourished male in no acute distress, in a pleasant mood.    SKIN: Focused examination of the face, neck and genital area was performed.  -x8-9 skin colored flat papules on the right side of the scrotum  -No other lesions of concern on areas examined.       Impression/Plan:  1. Genital Warts -  improved, nearly clear. Right ventral penis clear - only lesions were found on the right scrotum    Cryotherapy procedure note: After verbal consent and discussion of risks and benefits including but no limited to dyspigmentation/scar, blister, and pain, 9 were treated with 1-2mm freeze border for 2 cycles with liquid nitrogen. Post cryotherapy instructions were provided.    3rd injection of HPV is scheduled for December     Dr. Meyers on close of this encounter.  Follow-up in 3 weeks, earlier for new or changing lesions.       Staff Involved:  Staff/Scribe    Scribe Disclosure:  I, Michele Domingo, am serving as a scribe to document services personally performed by Nanda Gil PA-C, based on data collection and the provider's statements to me.     Provider Disclosure:   The documentation recorded by the scribe accurately reflects the services I personally performed and  the decisions made by me.    All risks, benefits and alternatives were discussed with patient.  Patient is in agreement and understands the assessment and plan.  All questions were answered.    Nanda Gil PA-C  [facility]: Phone: [phone], Fax: [phone]

## 2019-10-30 NOTE — LETTER
10/30/2019         RE: Sang Koroma  [address] [facility] [address]        Dear Colleague,    Thank you for referring your patient, Sang Koroma, to the [facility]. Please see a copy of my visit note below.    [facility] Dermatology Note      Dermatology Problem List:  1. 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 imiquimod  2. HSV positive per patient    Encounter Date: Oct 30, 2019    CC:  Chief Complaint   Patient presents with     RECHECK     genital warts         History of Present Illness:  Mr. Sang Koroma is a 44 year old male who presents as a follow-up for genital warts. The patient was last seen on 09/10/19 by Dr. Simental when cryotherapy was administered to 12 warts on the base of the penis, mons pubis, scrotum and right groin.     At today's visit, the patient notes he has not noticed any new warts since the last visit. He states that he has not been using topical medication on his warts because they would worsen his warts. He notes that he has had 2/3 shots for HPV and will be having the 3rd in December. The patient denies painful, itching, tingling or bleeding lesions unless otherwise noted.    Past Medical History:   Patient Active Problem List   Diagnosis     Major depressive disorder in full remission (H)     Medial tibial stress syndrome     Elevated serum creatinine     Gout involving toe of right foot     Herpes simplex infection of genitourinary system     Nonintractable episodic headache     No past medical history on file.  No past surgical history on file.    Social History:   reports that he has never smoked. He has never used smokeless tobacco. He reports current alcohol use. He reports that he does not use  drugs.    Family History:  Family History   Problem Relation Age of Onset     Cancer Maternal Grandfather      Cancer Paternal Grandmother      Cancer Paternal Grandfather      Gout Brother      Crohn's Disease Brother        Medications:  Current Outpatient Medications   Medication Sig Dispense Refill     acyclovir (ZOVIRAX) 400 MG tablet Take 1 tablet (400 mg) by mouth 2 times daily 180 tablet 3     indomethacin (INDOCIN) 25 MG capsule Take 1 capsule (25 mg) by mouth 3 times daily (with meals) (Patient not taking: Reported on 10/30/2019) 42 capsule 1     valACYclovir (VALTREX) 500 MG tablet Take 1 tablet (500 mg) by mouth 2 times daily for 3 days 14 tablet 0       No Known Allergies    Review of Systems:  -Constitutional: The patient denies fatigue, fevers, chills, unintended weight loss, and night sweats.  -HEENT: Patient denies nonhealing oral sores.  -Skin: As above in HPI. No additional skin concerns.    Physical exam:  Vitals: There were no vitals taken for this visit.  GEN: This is a well developed, well-nourished male in no acute distress, in a pleasant mood.    SKIN: Focused examination of the face, neck and genital area was performed.  -x8-9 skin colored flat papules on the right side of the scrotum  -No other lesions of concern on areas examined.       Impression/Plan:  1. Genital Warts -  improved, nearly clear. Right ventral penis clear - only lesions were found on the right scrotum    Cryotherapy procedure note: After verbal consent and discussion of risks and benefits including but no limited to dyspigmentation/scar, blister, and pain, 9 were treated with 1-2mm freeze border for 2 cycles with liquid nitrogen. Post cryotherapy instructions were provided.    3rd injection of HPV is scheduled for December     Dr. Meyers on close of this encounter.  Follow-up in 3 weeks, earlier for new or changing lesions.       Staff Involved:  Staff/Scribe    Scribe Disclosure:  I, Michele Domingo, am serving as a scribe  to document services personally performed by Nanda Gil PA-C, based on data collection and the provider's statements to me.     Provider Disclosure:   The documentation recorded by the scribe accurately reflects the services I personally performed and the decisions made by me.    All risks, benefits and alternatives were discussed with patient.  Patient is in agreement and understands the assessment and plan.  All questions were answered.    Nanda Gil PA-C  [facility] [place]: Phone: [phone], Fax: [phone]                      Again, thank you for allowing me to participate in the care of your patient.        Sincerely,        Nanda Gil PA-C

## 2019-10-30 NOTE — NURSING NOTE
Sang Koroma's goals for this visit include:   Chief Complaint   Patient presents with     RECHECK     genital warts       He requests these members of his care team be copied on today's visit information: no    PCP: Mian Hunt    Referring Provider:  No referring provider defined for this encounter.    There were no vitals taken for this visit.    Do you need any medication refills at today's visit? No    Marium Shah LPN

## 2019-11-22 NOTE — PROGRESS NOTES
ENT Consultation    Sang Koroma who is a 44 year old male seen in consultation at the request of Delfino Moya PA-C.      History of Present Illness - Sang Koroma is a 44 year old male presents for evaluation of right nasal lesion.  He notes that a couple months ago.  He feels it has grown.  It bleeds periodically also caused some nasal obstruction.  Does not hurt.  No fever no chills.      Past Medical History - No past medical history on file.    Current Medications -   Current Outpatient Medications:      acyclovir (ZOVIRAX) 400 MG tablet, Take 1 tablet (400 mg) by mouth 2 times daily, Disp: 180 tablet, Rfl: 3     indomethacin (INDOCIN) 25 MG capsule, Take 1 capsule (25 mg) by mouth 3 times daily (with meals) (Patient not taking: Reported on 10/30/2019), Disp: 42 capsule, Rfl: 1     valACYclovir (VALTREX) 500 MG tablet, Take 1 tablet (500 mg) by mouth 2 times daily for 3 days, Disp: 14 tablet, Rfl: 0    Allergies - No Known Allergies    Social History -   Social History     Socioeconomic History     Marital status:      Spouse name: Not on file     Number of children: Not on file     Years of education: Not on file     Highest education level: Not on file   Occupational History     Not on file   Social Needs     Financial resource strain: Not on file     Food insecurity:     Worry: Not on file     Inability: Not on file     Transportation needs:     Medical: Not on file     Non-medical: Not on file   Tobacco Use     Smoking status: Never Smoker     Smokeless tobacco: Never Used   Substance and Sexual Activity     Alcohol use: Yes     Comment: occ     Drug use: No     Sexual activity: Yes     Partners: Female     Birth control/protection: Condom   Lifestyle     Physical activity:     Days per week: Not on file     Minutes per session: Not on file     Stress: Not on file   Relationships     Social connections:     Talks on phone: Not on file     Gets together: Not on file     Attends  Restoration service: Not on file     Active member of club or organization: Not on file     Attends meetings of clubs or organizations: Not on file     Relationship status: Not on file     Intimate partner violence:     Fear of current or ex partner: Not on file     Emotionally abused: Not on file     Physically abused: Not on file     Forced sexual activity: Not on file   Other Topics Concern     Parent/sibling w/ CABG, MI or angioplasty before 65F 55M? No   Social History Narrative     Not on file       Family History -   Family History   Problem Relation Age of Onset     Cancer Maternal Grandfather      Cancer Paternal Grandmother      Cancer Paternal Grandfather      Gout Brother      Crohn's Disease Brother        Review of Systems - As per HPI and PMHx, otherwise review of system review of the head and neck negative. Otherwise 10+ review of system is negative    Physical Exam  There were no vitals taken for this visit.  BMI: There is no height or weight on file to calculate BMI.    General - The patient is well nourished and well developed, and appears to have good nutritional status.  Alert and oriented to person and place, answers questions and cooperates with examination appropriately.    SKIN - No suspicious lesions or rashes.  Respiration - No respiratory distress.  Head and Face - Normocephalic and atraumatic, with no gross asymmetry noted of the contour of the facial features.  The facial nerve is intact, with strong symmetric movements.    Voice and Breathing - The patient was breathing comfortably without the use of accessory muscles. The patients voice was clear and strong, and had appropriate pitch and quality.    Ears - Bilateral pinna and EACs with normal appearing overlying skin. Tympanic membrane intact with good mobility on pneumatic otoscopy bilaterally. Bony landmarks of the ossicular chain are normal. The tympanic membranes are normal in appearance. No retraction, perforation, or masses.  No  fluid or purulence was seen in the external canal or the middle ear.     Eyes - Extraocular movements intact.  Sclera were not icteric or injected, conjunctiva were pink and moist.    Mouth - Examination of the oral cavity showed pink, healthy oral mucosa. No lesions or ulcerations noted.  The tongue was mobile and midline, and the dentition were in good condition.      Throat - The walls of the oropharynx were smooth, pink, moist, symmetric, and had no lesions or ulcerations.  The tonsillar pillars and soft palate were symmetric.  The uvula was midline on elevation.    Neck - Normal midline excursion of the laryngotracheal complex during swallowing.  Full range of motion on passive movement.  Palpation of the occipital, submental, submandibular, internal jugular chain, and supraclavicular nodes did not demonstrate any abnormal lymph nodes or masses.  The carotid pulse was palpable bilaterally.  Palpation of the thyroid was soft and smooth, with no nodules or goiter appreciated.  The trachea was mobile and midline.    Nose - External contour is symmetric, no gross deflection or scars.  Nasal mucosa is pink and moist with no abnormal mucus.  The septum was midline and non-obstructive, turbinates of normal size and position.  On the right side superior aspect of the nasal vestibule I can see what appears to be verrucous lesion coming from the lateral aspect of the nares.  Neuro - Nonfocal neuro exam is normal, CN 2 through 12 intact, normal gait and muscle tone.      Performed in clinic today:  Biopsy of intranasal lesion.  Patient is transverse and benefits of the procedure were still done.  Topical anesthesia was provided using  The spray along with a topical lidocaine gel.  After about 10 minutes of topical anesthetic using cup forceps of the guidance of magnified speculum I was able to get adequate tissue for biopsy.  Hemostasis was controlled with silver nitrate.  Patient tolerated procedure well.      A/P -  Sang Koroma is a 44 year old male with verrucous nasal lesion.  We will await the results of the biopsy to decide on further course of action.  We will contact him next week in regard to this and based on the pathology report we will decide on further complete excision of the lesion.      Estuardo Arcos MD

## 2019-11-27 ENCOUNTER — OFFICE VISIT (OUTPATIENT)
Dept: DERMATOLOGY | Facility: CLINIC | Age: 44
End: 2019-11-27
Payer: COMMERCIAL

## 2019-11-27 ENCOUNTER — OFFICE VISIT (OUTPATIENT)
Dept: OTOLARYNGOLOGY | Facility: OTHER | Age: 44
End: 2019-11-27
Payer: COMMERCIAL

## 2019-11-27 VITALS
HEIGHT: 67 IN | WEIGHT: 219.75 LBS | BODY MASS INDEX: 34.49 KG/M2 | SYSTOLIC BLOOD PRESSURE: 118 MMHG | DIASTOLIC BLOOD PRESSURE: 86 MMHG

## 2019-11-27 DIAGNOSIS — A63.0 CONDYLOMA: Primary | ICD-10-CM

## 2019-11-27 DIAGNOSIS — J34.89 INTERNAL NASAL LESION: Primary | ICD-10-CM

## 2019-11-27 PROCEDURE — 88305 TISSUE EXAM BY PATHOLOGIST: CPT | Performed by: OTOLARYNGOLOGY

## 2019-11-27 PROCEDURE — 54056 CRYOSURGERY PENIS LESION(S): CPT | Performed by: PHYSICIAN ASSISTANT

## 2019-11-27 PROCEDURE — 99243 OFF/OP CNSLTJ NEW/EST LOW 30: CPT | Mod: 25 | Performed by: OTOLARYNGOLOGY

## 2019-11-27 PROCEDURE — 30100 INTRANASAL BIOPSY: CPT | Performed by: OTOLARYNGOLOGY

## 2019-11-27 PROCEDURE — 17110 DESTRUCTION B9 LES UP TO 14: CPT | Mod: 51 | Performed by: PHYSICIAN ASSISTANT

## 2019-11-27 ASSESSMENT — PAIN SCALES - GENERAL
PAINLEVEL: NO PAIN (0)
PAINLEVEL: NO PAIN (0)

## 2019-11-27 ASSESSMENT — MIFFLIN-ST. JEOR: SCORE: 1857.47

## 2019-11-27 NOTE — LETTER
11/27/2019         RE: Sang Koroma  [address] [facility] [address]        Dear Colleague,    Thank you for referring your patient, Sang Koroma, to the [facility]. Please see a copy of my visit note below.    ENT Consultation    Sang Koroma who is a 44 year old male seen in consultation at the request of Delfino Moya PA-C.      History of Present Illness - Sang Koroma is a 44 year old male presents for evaluation of right nasal lesion.  He notes that a couple months ago.  He feels it has grown.  It bleeds periodically also caused some nasal obstruction.  Does not hurt.  No fever no chills.      Past Medical History - No past medical history on file.    Current Medications -   Current Outpatient Medications:      acyclovir (ZOVIRAX) 400 MG tablet, Take 1 tablet (400 mg) by mouth 2 times daily, Disp: 180 tablet, Rfl: 3     indomethacin (INDOCIN) 25 MG capsule, Take 1 capsule (25 mg) by mouth 3 times daily (with meals) (Patient not taking: Reported on 10/30/2019), Disp: 42 capsule, Rfl: 1     valACYclovir (VALTREX) 500 MG tablet, Take 1 tablet (500 mg) by mouth 2 times daily for 3 days, Disp: 14 tablet, Rfl: 0    Allergies - No Known Allergies    Social History -   Social History     Socioeconomic History     Marital status:      Spouse name: Not on file     Number of children: Not on file     Years of education: Not on file     Highest education level: Not on file   Occupational History     Not on file   Social Needs     Financial resource strain: Not on file     Food insecurity:     Worry: Not on file     Inability: Not on file     Transportation needs:     Medical: Not on file     Non-medical: Not on file   Tobacco Use     Smoking status: Never Smoker     Smokeless tobacco: Never Used   Substance and Sexual Activity     Alcohol use: Yes     Comment: occ     Drug use: No     Sexual activity: Yes     Partners: Female     Birth  control/protection: Condom   Lifestyle     Physical activity:     Days per week: Not on file     Minutes per session: Not on file     Stress: Not on file   Relationships     Social connections:     Talks on phone: Not on file     Gets together: Not on file     Attends [employer] service: Not on file     Active member of club or organization: Not on file     Attends meetings of clubs or organizations: Not on file     Relationship status: Not on file     Intimate partner violence:     Fear of current or ex partner: Not on file     Emotionally abused: Not on file     Physically abused: Not on file     Forced sexual activity: Not on file   Other Topics Concern     Parent/sibling w/ CABG, MI or angioplasty before 65F 55M? No   Social History Narrative     Not on file       Family History -   Family History   Problem Relation Age of Onset     Cancer Maternal Grandfather      Cancer Paternal Grandmother      Cancer Paternal Grandfather      Gout Brother      Crohn's Disease Brother        Review of Systems - As per HPI and PMHx, otherwise review of system review of the head and neck negative. Otherwise 10+ review of system is negative    Physical Exam  There were no vitals taken for this visit.  BMI: There is no height or weight on file to calculate BMI.    General - The patient is well nourished and well developed, and appears to have good nutritional status.  Alert and oriented to person and place, answers questions and cooperates with examination appropriately.    SKIN - No suspicious lesions or rashes.  Respiration - No respiratory distress.  Head and Face - Normocephalic and atraumatic, with no gross asymmetry noted of the contour of the facial features.  The facial nerve is intact, with strong symmetric movements.    Voice and Breathing - The patient was breathing comfortably without the use of accessory muscles. The patients voice was clear and strong, and had appropriate pitch and quality.    Ears - Bilateral pinna  and EACs with normal appearing overlying skin. Tympanic membrane intact with good mobility on pneumatic otoscopy bilaterally. Bony landmarks of the ossicular chain are normal. The tympanic membranes are normal in appearance. No retraction, perforation, or masses.  No fluid or purulence was seen in the external canal or the middle ear.     Eyes - Extraocular movements intact.  Sclera were not icteric or injected, conjunctiva were pink and moist.    Mouth - Examination of the oral cavity showed pink, healthy oral mucosa. No lesions or ulcerations noted.  The tongue was mobile and midline, and the dentition were in good condition.      Throat - The walls of the oropharynx were smooth, pink, moist, symmetric, and had no lesions or ulcerations.  The tonsillar pillars and soft palate were symmetric.  The uvula was midline on elevation.    Neck - Normal midline excursion of the laryngotracheal complex during swallowing.  Full range of motion on passive movement.  Palpation of the occipital, submental, submandibular, internal jugular chain, and supraclavicular nodes did not demonstrate any abnormal lymph nodes or masses.  The carotid pulse was palpable bilaterally.  Palpation of the thyroid was soft and smooth, with no nodules or goiter appreciated.  The trachea was mobile and midline.    Nose - External contour is symmetric, no gross deflection or scars.  Nasal mucosa is pink and moist with no abnormal mucus.  The septum was midline and non-obstructive, turbinates of normal size and position.  On the right side superior aspect of the nasal vestibule I can see what appears to be verrucous lesion coming from the lateral aspect of the nares.  Neuro - Nonfocal neuro exam is normal, CN 2 through 12 intact, normal gait and muscle tone.      Performed in clinic today:  Biopsy of intranasal lesion.  Patient is transverse and benefits of the procedure were still done.  Topical anesthesia was provided using  The spray along with a  topical lidocaine gel.  After about 10 minutes of topical anesthetic using cup forceps of the guidance of magnified speculum I was able to get adequate tissue for biopsy.  Hemostasis was controlled with silver nitrate.  Patient tolerated procedure well.      A/P - Sang Koroma is a 44 year old male with verrucous nasal lesion.  We will await the results of the biopsy to decide on further course of action.  We will contact him next week in regard to this and based on the pathology report we will decide on further complete excision of the lesion.      Estuardo Arcos MD      Again, thank you for allowing me to participate in the care of your patient.        Sincerely,        Estuardo Arcos MD, MD

## 2019-11-27 NOTE — LETTER
11/27/2019         RE: Sang Koroma  [address] [facility] [address]        Dear Colleague,    Thank you for referring your patient, Sang Koroma, to the [facility]. Please see a copy of my visit note below.    z  [facility] Dermatology Note      Dermatology Problem List:  1. 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 imiquimod  2. HSV positive per patient    Encounter Date: Nov 27, 2019    CC:  Chief Complaint   Patient presents with     Derm Problem     Wart tx -cryotherapy         History of Present Illness:  Mr. Sang Koroma is a 44 year old male who presents as a follow-up for genital warts. The patient was last seen on 10/30/2019 when cryotherapy was administered to 8-9 warts on the right side of the scrotum.    At today's visit, the patient notes improvement in his warts since the last treatment. He also notes some pink spots on his penis. The patient denies painful, itching, tingling or bleeding lesions unless otherwise noted.    Past Medical History:   Patient Active Problem List   Diagnosis     Major depressive disorder in full remission (H)     Medial tibial stress syndrome     Elevated serum creatinine     Gout involving toe of right foot     Herpes simplex infection of genitourinary system     Nonintractable episodic headache     No past medical history on file.  No past surgical history on file.    Social History:   reports that he has never smoked. He has never used smokeless tobacco. He reports current alcohol use. He reports that he does not use drugs.    Family History:  Family History   Problem Relation Age of Onset     Cancer Maternal Grandfather      Cancer Paternal Grandmother      Cancer Paternal Grandfather      Gout  Brother      Crohn's Disease Brother        Medications:  Current Outpatient Medications   Medication Sig Dispense Refill     acyclovir (ZOVIRAX) 400 MG tablet Take 1 tablet (400 mg) by mouth 2 times daily 180 tablet 3     valACYclovir (VALTREX) 500 MG tablet Take 1 tablet (500 mg) by mouth 2 times daily for 3 days 14 tablet 0       No Known Allergies    Review of Systems:  -Constitutional: The patient denies fatigue, fevers, chills, unintended weight loss, and night sweats.  -HEENT: Patient denies nonhealing oral sores.  -Skin: As above in HPI. No additional skin concerns.    Physical exam:  Vitals: There were no vitals taken for this visit.  GEN: This is a well developed, well-nourished male in no acute distress, in a pleasant mood.    SKIN: Focused examination of the face, neck and genital area was performed.  -x5 skin colored flat papules at the base of the penis  -x2 subtle skin colored flat papules on the ventral base of the penis  -x1 skin colored flat papule on the scrotum  -No other lesions of concern on areas examined.       Impression/Plan:  1. Genital Warts -  improved    Cryotherapy procedure note: After verbal consent and discussion of risks and benefits including but no limited to dyspigmentation/scar, blister, and pain, 8 were treated with 1-2mm freeze border for 2 cycles with liquid nitrogen. Post cryotherapy instructions were provided.    3rd HPV vaccination is scheduled for December    CC Dr. Meyers on close of this encounter.  Follow-up in 1 month, earlier for new or changing lesions.       Staff Involved:  Staff/Scribe    Scribe Disclosure:  I, Michele Domingo, am serving as a scribe to document services personally performed by Nanda Gil PA-C, based on data collection and the provider's statements to me.     Provider Disclosure:   The documentation recorded by the scribe accurately reflects the services I personally performed and the decisions made by me.    All risks, benefits and alternatives  were discussed with patient.  Patient is in agreement and understands the assessment and plan.  All questions were answered.    Nanda Gil PA-C  [facility] [place]: Phone: [phone], Fax: [phone]                          Again, thank you for allowing me to participate in the care of your patient.        Sincerely,        Nanda Gil PA-C

## 2019-11-27 NOTE — PROGRESS NOTES
z  [facility] Dermatology Note      Dermatology Problem List:  1. 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 imiquimod  2. HSV positive per patient    Encounter Date: Nov 27, 2019    CC:  Chief Complaint   Patient presents with     Derm Problem     Wart tx -cryotherapy         History of Present Illness:  Mr. Sang Koroma is a 44 year old male who presents as a follow-up for genital warts. The patient was last seen on 10/30/2019 when cryotherapy was administered to 8-9 warts on the right side of the scrotum.    At today's visit, the patient notes improvement in his warts since the last treatment. He also notes some pink spots on his penis. The patient denies painful, itching, tingling or bleeding lesions unless otherwise noted.    Past Medical History:   Patient Active Problem List   Diagnosis     Major depressive disorder in full remission (H)     Medial tibial stress syndrome     Elevated serum creatinine     Gout involving toe of right foot     Herpes simplex infection of genitourinary system     Nonintractable episodic headache     No past medical history on file.  No past surgical history on file.    Social History:   reports that he has never smoked. He has never used smokeless tobacco. He reports current alcohol use. He reports that he does not use drugs.    Family History:  Family History   Problem Relation Age of Onset     Cancer Maternal Grandfather      Cancer Paternal Grandmother      Cancer Paternal Grandfather      Gout Brother      Crohn's Disease Brother        Medications:  Current Outpatient Medications   Medication Sig Dispense Refill     acyclovir (ZOVIRAX) 400 MG tablet Take 1 tablet (400 mg) by mouth 2 times daily 180 tablet 3     valACYclovir (VALTREX) 500 MG tablet  Take 1 tablet (500 mg) by mouth 2 times daily for 3 days 14 tablet 0       No Known Allergies    Review of Systems:  -Constitutional: The patient denies fatigue, fevers, chills, unintended weight loss, and night sweats.  -HEENT: Patient denies nonhealing oral sores.  -Skin: As above in HPI. No additional skin concerns.    Physical exam:  Vitals: There were no vitals taken for this visit.  GEN: This is a well developed, well-nourished male in no acute distress, in a pleasant mood.    SKIN: Focused examination of the face, neck and genital area was performed.  -x5 skin colored flat papules at the base of the penis  -x2 subtle skin colored flat papules on the ventral base of the penis  -x1 skin colored flat papule on the scrotum  -No other lesions of concern on areas examined.       Impression/Plan:  1. Genital Warts -  improved    Cryotherapy procedure note: After verbal consent and discussion of risks and benefits including but no limited to dyspigmentation/scar, blister, and pain, 8 were treated with 1-2mm freeze border for 2 cycles with liquid nitrogen. Post cryotherapy instructions were provided.    3rd HPV vaccination is scheduled for December    CC Dr. Meyers on close of this encounter.  Follow-up in 1 month, earlier for new or changing lesions.       Staff Involved:  Staff/Scribe    Scribe Disclosure:  I, Michele Domingo, am serving as a scribe to document services personally performed by Nanda Gil PA-C, based on data collection and the provider's statements to me.     Provider Disclosure:   The documentation recorded by the scribe accurately reflects the services I personally performed and the decisions made by me.    All risks, benefits and alternatives were discussed with patient.  Patient is in agreement and understands the assessment and plan.  All questions were answered.    Nanda Gil PA-C  [facility] [place]: Phone: [phone], Fax:  [phone]

## 2019-12-02 LAB — COPATH REPORT: NORMAL

## 2019-12-30 ENCOUNTER — ALLIED HEALTH/NURSE VISIT (OUTPATIENT)
Dept: PEDIATRICS | Facility: CLINIC | Age: 44
End: 2019-12-30
Payer: COMMERCIAL

## 2019-12-30 DIAGNOSIS — Z23 NEED FOR VACCINATION: Primary | ICD-10-CM

## 2019-12-30 PROCEDURE — 99207 ZZC NO CHARGE NURSE ONLY: CPT

## 2019-12-30 PROCEDURE — 90471 IMMUNIZATION ADMIN: CPT

## 2019-12-30 PROCEDURE — 90651 9VHPV VACCINE 2/3 DOSE IM: CPT

## 2019-12-30 NOTE — PROGRESS NOTES
Sang Koroma comes into clinic today at the request of Dr Simental Ordering Provider for Med Injection only HPV.      This service provided today was under the supervising provider of the day Dr José, who was available if needed.    Vani Chávez CMA  Prior to immunization administration, verified patients identity using patient s name and date of birth. Please see Immunization Activity for additional information.     Screening Questionnaire for Adult Immunization    Are you sick today?   No   Do you have allergies to medications, food, a vaccine component or latex?   No   Have you ever had a serious reaction after receiving a vaccination?   No   Do you have a long-term health problem with heart, lung, kidney, or metabolic disease (e.g., diabetes), asthma, a blood disorder, no spleen, complement component deficiency, a cochlear implant, or a spinal fluid leak?  Are you on long-term aspirin therapy?   No   Do you have cancer, leukemia, HIV/AIDS, or any other immune system problem?   No   Do you have a parent, brother, or sister with an immune system problem?   No   In the past 3 months, have you taken medications that affect  your immune system, such as prednisone, other steroids, or anticancer drugs; drugs for the treatment of rheumatoid arthritis, Crohn s disease, or psoriasis; or have you had radiation treatments?   No   Have you had a seizure, or a brain or other nervous system problem?   No   During the past year, have you received a transfusion of blood or blood    products, or been given immune (gamma) globulin or antiviral drug?   No   For women: Are you pregnant or is there a chance you could become       pregnant during the next month?   No   Have you received any vaccinations in the past 4 weeks?   No     Immunization questionnaire answers were all negative.        Per orders of Dr. Simental, injection of HPV given by Vani Chávez CMA. Patient instructed to remain in clinic for 15 minutes afterwards,  and to report any adverse reaction to me immediately.       Screening performed by Vani Chávez CMA on 12/30/2019 at 4:21 PM.

## 2020-01-28 ENCOUNTER — TELEPHONE (OUTPATIENT)
Dept: SLEEP MEDICINE | Facility: CLINIC | Age: 45
End: 2020-01-28

## 2020-01-28 ENCOUNTER — TELEPHONE (OUTPATIENT)
Dept: OTOLARYNGOLOGY | Facility: OTHER | Age: 45
End: 2020-01-28

## 2020-01-28 ENCOUNTER — PREP FOR PROCEDURE (OUTPATIENT)
Dept: OTOLARYNGOLOGY | Facility: CLINIC | Age: 45
End: 2020-01-28

## 2020-01-28 DIAGNOSIS — J34.89 INTERNAL NASAL LESION: Primary | ICD-10-CM

## 2020-01-28 NOTE — TELEPHONE ENCOUNTER
I have placed orders for definitive excision of this lesion in the OR under local anesthetic.  However would like to see the patient next week either in [place] or [place] to further discuss and plan the procedure.  Estuardo Arcos MD

## 2020-01-28 NOTE — TELEPHONE ENCOUNTER
"Patient sent a web request today:    \"I had a biopsy done on a growth in my nose in Dec 19. A piece was cut off big enough to help me breath on that side. It has grown back and causing me to wake up from not breathing well at night. I want it cut out. Ear/nose/throat doctor that is at [facility] only on Wed or Thur was dev I saw.\"    Patient would like [facility] schedule [facility] late [facility]noons.    Please contact patient.    Thank you.    Central Scheduling  Karie CROSS"

## 2020-01-28 NOTE — TELEPHONE ENCOUNTER
Writing nurse called patient and LVM asking if patient was able to see DR. Arcos in ER on 2/5/2020 at 3:00 per DR. Gaitan request to see patient next week prior to procedure.   Sharon Nelson RN on 1/28/2020 at 2:16 PM

## 2020-01-29 PROBLEM — J34.89 INTERNAL NASAL LESION: Status: ACTIVE | Noted: 2020-01-29

## 2020-01-29 NOTE — TELEPHONE ENCOUNTER
Type of surgery: EXCISION, lesion intranasal right side (Right)   Location of surgery: [facility] OR  Date and time of surgery: 3/3  Surgeon: Josselyn  Pre-Op Appt Date: NA local  Post-Op Appt Date: 3/11   Packet sent out: Yes  Pre-cert/Authorization completed:  Not Applicable  Date: na

## 2020-01-29 NOTE — TELEPHONE ENCOUNTER
Patient is on the schedule to see Dr. Arcos next week in ER.    Sharon Nelson RN on 1/29/2020 at 10:11 AM

## 2020-01-31 NOTE — PROGRESS NOTES
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  Correlation with   molecular testing for HPV may be   requested if clinically deemed necessary.  There is no evidence of   dysplasia or malignancy in the planes   examined.     It continues to bother him a lot of secretions and nasal obstruction on the right side.  Past Medical History - No past medical history on file.    Current Medications -   Current Outpatient Medications:      acyclovir (ZOVIRAX) 400 MG tablet, Take 1 tablet (400 mg) by mouth 2 times daily, Disp: 180 tablet, Rfl: 3     valACYclovir (VALTREX) 500 MG tablet, Take 1 tablet (500 mg) by mouth 2 times daily for 3 days, Disp: 14 tablet, Rfl: 0    Allergies - No Known Allergies    Social History -   Social History     Socioeconomic History     Marital status:      Spouse name: Not on file     Number of children: Not on file     Years of education: Not on file     Highest education level: Not on file   Occupational History     Not on file   Social Needs     Financial resource strain: Not on file     Food insecurity:     Worry: Not on file     Inability: Not on file     Transportation needs:     Medical: Not on file     Non-medical: Not on file   Tobacco Use     Smoking status: Never Smoker     Smokeless tobacco: Never Used   Substance and Sexual Activity     Alcohol use: Yes     Comment: occ     Drug use: No     Sexual activity: Yes     Partners: Female     Birth control/protection: Condom   Lifestyle     Physical activity:     Days per week: Not on file     Minutes per session: Not on file     Stress: Not on file    Relationships     Social connections:     Talks on phone: Not on file     Gets together: Not on file     Attends Latter day service: Not on file     Active member of club or organization: Not on file     Attends meetings of clubs or organizations: Not on file     Relationship status: Not on file     Intimate partner violence:     Fear of current or ex partner: Not on file     Emotionally abused: Not on file     Physically abused: Not on file     Forced sexual activity: Not on file   Other Topics Concern     Parent/sibling w/ CABG, MI or angioplasty before 65F 55M? No   Social History Narrative     Not on file       Family History -   Family History   Problem Relation Age of Onset     Cancer Maternal Grandfather      Cancer Paternal Grandmother      Cancer Paternal Grandfather      Gout Brother      Crohn's Disease Brother        Review of Systems - As per HPI and PMHx, otherwise review of system review of the head and neck negative. Otherwise 10+ review systems negative.    Physical Exam  There were no vitals taken for this visit.  BMI: There is no height or weight on file to calculate BMI.    General - The patient is well nourished and well developed, and appears to have good nutritional status.  Alert and oriented to person and place, answers questions and cooperates with examination appropriately.    SKIN - No suspicious lesions or rashes.  Respiration - No respiratory distress.  Head and Face - Normocephalic and atraumatic, with no gross asymmetry noted of the contour of the facial features.  The facial nerve is intact, with strong symmetric movements.    Voice and Breathing - The patient was breathing comfortably without the use of accessory muscles. The patients voice was clear and strong, and had appropriate pitch and quality.    Ears - Bilateral pinna and EACs with normal appearing overlying skin. Tympanic membrane intact with good mobility on pneumatic otoscopy bilaterally. Bony landmarks of the ossicular  chain are normal. The tympanic membranes are normal in appearance. No retraction, perforation, or masses.  No fluid or purulence was seen in the external canal or the middle ear.     Eyes - Extraocular movements intact.  Sclera were not icteric or injected, conjunctiva were pink and moist.    Mouth - Examination of the oral cavity showed pink, healthy oral mucosa. No lesions or ulcerations noted.  The tongue was mobile and midline, and the dentition were in good condition.      Throat - The walls of the oropharynx were smooth, pink, moist, symmetric, and had no lesions or ulcerations.  The tonsillar pillars and soft palate were symmetric.  The uvula was midline on elevation.    Neck - Normal midline excursion of the laryngotracheal complex during swallowing.  Full range of motion on passive movement.  Palpation of the occipital, submental, submandibular, internal jugular chain, and supraclavicular nodes did not demonstrate any abnormal lymph nodes or masses.  The carotid pulse was palpable bilaterally.  Palpation of the thyroid was soft and smooth, with no nodules or goiter appreciated.  The trachea was mobile and midline.    Nose - External contour is symmetric, no gross deflection or scars.  Nasal mucosa is pink and moist with no abnormal mucus.  The septum was midline and non-obstructive, turbinates of normal size and position.  On the right side anteriorly large voluminous lesion that used to be relatively dry was noted on examination.    Neuro - Nonfocal neuro exam is normal, CN 2 through 12 intact, normal gait and muscle tone.      Performed in clinic today:  No procedures preformed in clinic today          A/P - Sang Koroma is a 44 year old male Patient presents with:  RECHECK: wants to discuss lesion procedure        At this point we discussed further removal of the lesion.  Risks and benefits discussed main risk of course to the external nasal valve area lateral aspect of the lower lateral  cartilage and alar cartilage in the right side.  Potential cosmetic deformity and scarring discussed as well.  Patient understands our discussion and states that he wishes to proceed with the removal of right intranasal lesion under local with sedation in the operating room setting.        Estuardo Arcos MD

## 2020-02-05 ENCOUNTER — OFFICE VISIT (OUTPATIENT)
Dept: OTOLARYNGOLOGY | Facility: OTHER | Age: 45
End: 2020-02-05
Payer: COMMERCIAL

## 2020-02-05 VITALS
BODY MASS INDEX: 34.92 KG/M2 | DIASTOLIC BLOOD PRESSURE: 78 MMHG | WEIGHT: 222.5 LBS | SYSTOLIC BLOOD PRESSURE: 112 MMHG | HEIGHT: 67 IN

## 2020-02-05 DIAGNOSIS — J34.89 INTERNAL NASAL LESION: Primary | ICD-10-CM

## 2020-02-05 PROCEDURE — 99213 OFFICE O/P EST LOW 20 MIN: CPT | Performed by: OTOLARYNGOLOGY

## 2020-02-05 ASSESSMENT — MIFFLIN-ST. JEOR: SCORE: 1867.88

## 2020-02-05 ASSESSMENT — PAIN SCALES - GENERAL: PAINLEVEL: NO PAIN (0)

## 2020-02-05 NOTE — LETTER
2/5/2020         RE: Sang Koroma  [address] [facility] [address]        Dear Colleague,    Thank you for referring your patient, Sang Koroma, to the [facility]. Please see a copy of my visit note below.    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  Correlation with   molecular testing for HPV may be   requested if clinically deemed necessary.  There is no evidence of   dysplasia or malignancy in the planes   examined.     It continues to bother him a lot of secretions and nasal obstruction on the right side.  Past Medical History - No past medical history on file.    Current Medications -   Current Outpatient Medications:      acyclovir (ZOVIRAX) 400 MG tablet, Take 1 tablet (400 mg) by mouth 2 times daily, Disp: 180 tablet, Rfl: 3     valACYclovir (VALTREX) 500 MG tablet, Take 1 tablet (500 mg) by mouth 2 times daily for 3 days, Disp: 14 tablet, Rfl: 0    Allergies - No Known Allergies    Social History -   Social History     Socioeconomic History     Marital status:      Spouse name: Not on file     Number of children: Not on file     Years of education: Not on file     Highest education level: Not on file   Occupational History     Not on file   Social Needs     Financial resource strain: Not on file     Food insecurity:     Worry: Not on file     Inability: Not on file     Transportation needs:     Medical: Not on file     Non-medical: Not on file   Tobacco Use     Smoking status: Never Smoker     Smokeless tobacco: Never Used   Substance and Sexual Activity     Alcohol  use: Yes     Comment: occ     Drug use: No     Sexual activity: Yes     Partners: Female     Birth control/protection: Condom   Lifestyle     Physical activity:     Days per week: Not on file     Minutes per session: Not on file     Stress: Not on file   Relationships     Social connections:     Talks on phone: Not on file     Gets together: Not on file     Attends Jewish service: Not on file     Active member of club or organization: Not on file     Attends meetings of clubs or organizations: Not on file     Relationship status: Not on file     Intimate partner violence:     Fear of current or ex partner: Not on file     Emotionally abused: Not on file     Physically abused: Not on file     Forced sexual activity: Not on file   Other Topics Concern     Parent/sibling w/ CABG, MI or angioplasty before 65F 55M? No   Social History Narrative     Not on file       Family History -   Family History   Problem Relation Age of Onset     Cancer Maternal Grandfather      Cancer Paternal Grandmother      Cancer Paternal Grandfather      Gout Brother      Crohn's Disease Brother        Review of Systems - As per HPI and PMHx, otherwise review of system review of the head and neck negative. Otherwise 10+ review systems negative.    Physical Exam  There were no vitals taken for this visit.  BMI: There is no height or weight on file to calculate BMI.    General - The patient is well nourished and well developed, and appears to have good nutritional status.  Alert and oriented to person and place, answers questions and cooperates with examination appropriately.    SKIN - No suspicious lesions or rashes.  Respiration - No respiratory distress.  Head and Face - Normocephalic and atraumatic, with no gross asymmetry noted of the contour of the facial features.  The facial nerve is intact, with strong symmetric movements.    Voice and Breathing - The patient was breathing comfortably without the use of accessory muscles. The  patients voice was clear and strong, and had appropriate pitch and quality.    Ears - Bilateral pinna and EACs with normal appearing overlying skin. Tympanic membrane intact with good mobility on pneumatic otoscopy bilaterally. Bony landmarks of the ossicular chain are normal. The tympanic membranes are normal in appearance. No retraction, perforation, or masses.  No fluid or purulence was seen in the external canal or the middle ear.     Eyes - Extraocular movements intact.  Sclera were not icteric or injected, conjunctiva were pink and moist.    Mouth - Examination of the oral cavity showed pink, healthy oral mucosa. No lesions or ulcerations noted.  The tongue was mobile and midline, and the dentition were in good condition.      Throat - The walls of the oropharynx were smooth, pink, moist, symmetric, and had no lesions or ulcerations.  The tonsillar pillars and soft palate were symmetric.  The uvula was midline on elevation.    Neck - Normal midline excursion of the laryngotracheal complex during swallowing.  Full range of motion on passive movement.  Palpation of the occipital, submental, submandibular, internal jugular chain, and supraclavicular nodes did not demonstrate any abnormal lymph nodes or masses.  The carotid pulse was palpable bilaterally.  Palpation of the thyroid was soft and smooth, with no nodules or goiter appreciated.  The trachea was mobile and midline.    Nose - External contour is symmetric, no gross deflection or scars.  Nasal mucosa is pink and moist with no abnormal mucus.  The septum was midline and non-obstructive, turbinates of normal size and position.  On the right side anteriorly large voluminous lesion that used to be relatively dry was noted on examination.    Neuro - Nonfocal neuro exam is normal, CN 2 through 12 intact, normal gait and muscle tone.      Performed in clinic today:  No procedures preformed in clinic today          A/P - Sang Filiberto Barrygennava is a 44 year old  male Patient presents with:  RECHECK: wants to discuss lesion procedure        At this point we discussed further removal of the lesion.  Risks and benefits discussed main risk of course to the external nasal valve area lateral aspect of the lower lateral cartilage and alar cartilage in the right side.  Potential cosmetic deformity and scarring discussed as well.  Patient understands our discussion and states that he wishes to proceed with the removal of right intranasal lesion under local with sedation in the operating room setting.        Estuardo Arcos MD        Again, thank you for allowing me to participate in the care of your patient.        Sincerely,        Estuardo Arcos MD, MD

## 2020-03-03 ENCOUNTER — ANESTHESIA EVENT (OUTPATIENT)
Dept: SURGERY | Facility: CLINIC | Age: 45
End: 2020-03-03
Payer: COMMERCIAL

## 2020-03-03 ENCOUNTER — HOSPITAL ENCOUNTER (OUTPATIENT)
Facility: CLINIC | Age: 45
Discharge: HOME | End: 2020-03-03
Attending: OTOLARYNGOLOGY | Admitting: OTOLARYNGOLOGY
Payer: COMMERCIAL

## 2020-03-03 ENCOUNTER — ANESTHESIA (OUTPATIENT)
Dept: SURGERY | Facility: CLINIC | Age: 45
End: 2020-03-03
Payer: COMMERCIAL

## 2020-03-03 VITALS
HEART RATE: 54 BPM | DIASTOLIC BLOOD PRESSURE: 94 MMHG | TEMPERATURE: 98 F | RESPIRATION RATE: 16 BRPM | SYSTOLIC BLOOD PRESSURE: 130 MMHG | OXYGEN SATURATION: 97 %

## 2020-03-03 DIAGNOSIS — J34.89 INTERNAL NASAL LESION: ICD-10-CM

## 2020-03-03 DIAGNOSIS — G89.18 POSTOPERATIVE PAIN: Primary | ICD-10-CM

## 2020-03-03 PROCEDURE — 25000125 ZZHC RX 250: Performed by: OTOLARYNGOLOGY

## 2020-03-03 PROCEDURE — 36000056 ZZH SURGERY LEVEL 3 1ST 30 MIN: Performed by: OTOLARYNGOLOGY

## 2020-03-03 PROCEDURE — 36000058 ZZH SURGERY LEVEL 3 EA 15 ADDTL MIN: Performed by: OTOLARYNGOLOGY

## 2020-03-03 PROCEDURE — 25000128 H RX IP 250 OP 636: Performed by: OTOLARYNGOLOGY

## 2020-03-03 PROCEDURE — 40000306 ZZH STATISTIC PRE PROC ASSESS II: Performed by: OTOLARYNGOLOGY

## 2020-03-03 PROCEDURE — 71000027 ZZH RECOVERY PHASE 2 EACH 15 MINS: Performed by: OTOLARYNGOLOGY

## 2020-03-03 PROCEDURE — 27210794 ZZH OR GENERAL SUPPLY STERILE: Performed by: OTOLARYNGOLOGY

## 2020-03-03 PROCEDURE — 25800030 ZZH RX IP 258 OP 636: Performed by: NURSE ANESTHETIST, CERTIFIED REGISTERED

## 2020-03-03 PROCEDURE — 30117 REMOVAL OF INTRANASAL LESION: CPT | Mod: RT | Performed by: OTOLARYNGOLOGY

## 2020-03-03 PROCEDURE — 25000125 ZZHC RX 250: Performed by: NURSE ANESTHETIST, CERTIFIED REGISTERED

## 2020-03-03 PROCEDURE — 88305 TISSUE EXAM BY PATHOLOGIST: CPT | Mod: 26 | Performed by: OTOLARYNGOLOGY

## 2020-03-03 PROCEDURE — 88305 TISSUE EXAM BY PATHOLOGIST: CPT | Performed by: OTOLARYNGOLOGY

## 2020-03-03 PROCEDURE — 25000128 H RX IP 250 OP 636: Performed by: NURSE ANESTHETIST, CERTIFIED REGISTERED

## 2020-03-03 PROCEDURE — 37000008 ZZH ANESTHESIA TECHNICAL FEE, 1ST 30 MIN: Performed by: OTOLARYNGOLOGY

## 2020-03-03 PROCEDURE — 37000009 ZZH ANESTHESIA TECHNICAL FEE, EACH ADDTL 15 MIN: Performed by: OTOLARYNGOLOGY

## 2020-03-03 RX ORDER — DEXAMETHASONE SODIUM PHOSPHATE 10 MG/ML
10 INJECTION, SOLUTION INTRAMUSCULAR; INTRAVENOUS ONCE
Status: DISCONTINUED | OUTPATIENT
Start: 2020-03-03 | End: 2020-03-03 | Stop reason: HOSPADM

## 2020-03-03 RX ORDER — SODIUM CHLORIDE, SODIUM LACTATE, POTASSIUM CHLORIDE, CALCIUM CHLORIDE 600; 310; 30; 20 MG/100ML; MG/100ML; MG/100ML; MG/100ML
INJECTION, SOLUTION INTRAVENOUS CONTINUOUS
Status: DISCONTINUED | OUTPATIENT
Start: 2020-03-03 | End: 2020-03-03 | Stop reason: HOSPADM

## 2020-03-03 RX ORDER — PROPOFOL 10 MG/ML
INJECTION, EMULSION INTRAVENOUS CONTINUOUS PRN
Status: DISCONTINUED | OUTPATIENT
Start: 2020-03-03 | End: 2020-03-03

## 2020-03-03 RX ORDER — FENTANYL CITRATE 50 UG/ML
INJECTION, SOLUTION INTRAMUSCULAR; INTRAVENOUS PRN
Status: DISCONTINUED | OUTPATIENT
Start: 2020-03-03 | End: 2020-03-03

## 2020-03-03 RX ORDER — LIDOCAINE HYDROCHLORIDE AND EPINEPHRINE 10; 10 MG/ML; UG/ML
INJECTION, SOLUTION INFILTRATION; PERINEURAL PRN
Status: DISCONTINUED | OUTPATIENT
Start: 2020-03-03 | End: 2020-03-03 | Stop reason: HOSPADM

## 2020-03-03 RX ORDER — EPINEPHRINE 1 MG/ML
INJECTION, SOLUTION, CONCENTRATE INTRAVENOUS PRN
Status: DISCONTINUED | OUTPATIENT
Start: 2020-03-03 | End: 2020-03-03 | Stop reason: HOSPADM

## 2020-03-03 RX ORDER — ONDANSETRON 2 MG/ML
4 INJECTION INTRAMUSCULAR; INTRAVENOUS EVERY 30 MIN PRN
Status: CANCELLED | OUTPATIENT
Start: 2020-03-03

## 2020-03-03 RX ORDER — DIMENHYDRINATE 50 MG/ML
25 INJECTION, SOLUTION INTRAMUSCULAR; INTRAVENOUS
Status: CANCELLED | OUTPATIENT
Start: 2020-03-03

## 2020-03-03 RX ORDER — MEPERIDINE HYDROCHLORIDE 50 MG/ML
12.5 INJECTION INTRAMUSCULAR; INTRAVENOUS; SUBCUTANEOUS
Status: CANCELLED | OUTPATIENT
Start: 2020-03-03

## 2020-03-03 RX ORDER — HYDRALAZINE HYDROCHLORIDE 20 MG/ML
2.5-5 INJECTION INTRAMUSCULAR; INTRAVENOUS EVERY 10 MIN PRN
Status: CANCELLED | OUTPATIENT
Start: 2020-03-03

## 2020-03-03 RX ORDER — FENTANYL CITRATE 50 UG/ML
25-50 INJECTION, SOLUTION INTRAMUSCULAR; INTRAVENOUS
Status: CANCELLED | OUTPATIENT
Start: 2020-03-03

## 2020-03-03 RX ORDER — ONDANSETRON 4 MG/1
4 TABLET, ORALLY DISINTEGRATING ORAL EVERY 30 MIN PRN
Status: CANCELLED | OUTPATIENT
Start: 2020-03-03

## 2020-03-03 RX ORDER — LIDOCAINE 40 MG/G
CREAM TOPICAL
Status: DISCONTINUED | OUTPATIENT
Start: 2020-03-03 | End: 2020-03-03 | Stop reason: HOSPADM

## 2020-03-03 RX ORDER — HYDROCODONE BITARTRATE AND ACETAMINOPHEN 5; 325 MG/1; MG/1
1-2 TABLET ORAL EVERY 4 HOURS PRN
Qty: 15 TABLET | Refills: 0 | Status: SHIPPED | OUTPATIENT
Start: 2020-03-03 | End: 2020-03-11

## 2020-03-03 RX ORDER — HYDROMORPHONE HYDROCHLORIDE 1 MG/ML
.3-.5 INJECTION, SOLUTION INTRAMUSCULAR; INTRAVENOUS; SUBCUTANEOUS EVERY 10 MIN PRN
Status: CANCELLED | OUTPATIENT
Start: 2020-03-03

## 2020-03-03 RX ORDER — NALOXONE HYDROCHLORIDE 0.4 MG/ML
.1-.4 INJECTION, SOLUTION INTRAMUSCULAR; INTRAVENOUS; SUBCUTANEOUS
Status: CANCELLED | OUTPATIENT
Start: 2020-03-03 | End: 2020-03-04

## 2020-03-03 RX ORDER — LIDOCAINE HYDROCHLORIDE 20 MG/ML
INJECTION, SOLUTION INFILTRATION; PERINEURAL PRN
Status: DISCONTINUED | OUTPATIENT
Start: 2020-03-03 | End: 2020-03-03

## 2020-03-03 RX ORDER — ALBUTEROL SULFATE 0.83 MG/ML
2.5 SOLUTION RESPIRATORY (INHALATION) EVERY 4 HOURS PRN
Status: CANCELLED | OUTPATIENT
Start: 2020-03-03

## 2020-03-03 RX ORDER — SODIUM CHLORIDE, SODIUM LACTATE, POTASSIUM CHLORIDE, CALCIUM CHLORIDE 600; 310; 30; 20 MG/100ML; MG/100ML; MG/100ML; MG/100ML
INJECTION, SOLUTION INTRAVENOUS CONTINUOUS
Status: CANCELLED | OUTPATIENT
Start: 2020-03-03

## 2020-03-03 RX ADMIN — FENTANYL CITRATE 50 MCG: 50 INJECTION, SOLUTION INTRAMUSCULAR; INTRAVENOUS at 10:55

## 2020-03-03 RX ADMIN — MIDAZOLAM 2 MG: 1 INJECTION INTRAMUSCULAR; INTRAVENOUS at 10:55

## 2020-03-03 RX ADMIN — LIDOCAINE HYDROCHLORIDE 40 MG: 20 INJECTION, SOLUTION INFILTRATION; PERINEURAL at 11:02

## 2020-03-03 RX ADMIN — FENTANYL CITRATE 50 MCG: 50 INJECTION, SOLUTION INTRAMUSCULAR; INTRAVENOUS at 11:21

## 2020-03-03 RX ADMIN — PROPOFOL 150 MCG/KG/MIN: 10 INJECTION, EMULSION INTRAVENOUS at 11:03

## 2020-03-03 RX ADMIN — SODIUM CHLORIDE, POTASSIUM CHLORIDE, SODIUM LACTATE AND CALCIUM CHLORIDE: 600; 310; 30; 20 INJECTION, SOLUTION INTRAVENOUS at 10:48

## 2020-03-03 NOTE — OP NOTE
OTOLARYNGOLOGY OPERATIVE NOTE    SURGEON: Jj Arcos MD     ASSISTANT: None    PREOPERATIVE DIAGNOSES:   1.  Right intranasal lesion    POSTOPERATIVE DIAGNOSES:   Same    PROCEDURE:   1.  Excision of right intranasal lesion    INDICATIONS: As above.     SURGICAL FINDINGS:   Papillomatous quite extensive lesion attached to the mucosa overlying inferior edge of the upper lateral cartilage intercartilaginous region.    BRIEF HISTORY: Patient has a history of large papillomatous right intranasal lesion. The patient understands the risks and benefits of surgery, limitations, complications including but not limited to bleeding, infection,  recurrence of symptoms, need for additional procedures, need for repeat procedures, chronic epistaxis, risks related to anesthesia amongst others. Wishes surgery and now fully agrees to it.     DESCRIPTION OF PROCEDURE: The patient is taken to the operating room, placed under sedation, appropriately positioned, prepped and draped. We examined the nose, saw that indeed there is a significant papillomatous lesion obstructing right nasal vestibule.  I injected 1% lidocaine with 1-100,000 epinephrine in the base of the lesion anteriorly.  Using #15 blade small incision was made in the mucosa of the upper nasal vestibule laterally.  As I defined the plane I am able to excise the base of the lesion completely using plastic scissors.  Once excised there a couple of areas of the mucosa that may be involved with a low residual papillomatous change.  Using plastic scissors those margins are removed.  I see exposed lower ledge of the upper lateral cartilage.  Hemostasis controlled in the area of the defect internally using silver nitrate.  After appropriate hemostasis I apply bacitracin ointment.  The patient tolerated the procedure well with about 2mL blood loss.  The patient was taken to recovery room in stable condition.  Specimen supplied to pathology.      JJ ARCOS MD

## 2020-03-03 NOTE — ANESTHESIA POSTPROCEDURE EVALUATION
Patient: Sang Koroma    Procedure(s):  EXCISION, lesion intranasal right side    Diagnosis:Internal nasal lesion [J34.89]  Diagnosis Additional Information: No value filed.    Anesthesia Type:  MAC    Note:  Anesthesia Post Evaluation    Patient location during evaluation: Phase 2  Patient participation: Able to fully participate in evaluation  Level of consciousness: awake  Pain management: adequate  Airway patency: patent  Cardiovascular status: acceptable and hemodynamically stable  Respiratory status: acceptable, room air and nonlabored ventilation  Hydration status: stable  PONV: none     Anesthetic complications: None    Comments: Patient was happy with the anesthesia care received and no anesthesia related complications were noted.  I will follow up with the patient again if it is needed.        Last vitals:  Vitals:    03/03/20 1129 03/03/20 1145 03/03/20 1200   BP: 121/86 (!) 124/96 (!) 130/94   Pulse:  63 54   Resp: 16     Temp:      SpO2: 96% 95% 97%         Electronically Signed By: RHYS Schroeder CRNA  March 3, 2020  12:21 PM

## 2020-03-03 NOTE — DISCHARGE INSTRUCTIONS
[facility] Same-Day [facility]   Adult Discharge Orders & Instructions     For 24 hours after surgery    1. Get plenty of rest.  A responsible adult must stay with you for at least 24 hours after you leave the hospital.   2. Do not drive or use heavy equipment.  If you have weakness or tingling, don't drive or use heavy equipment until this feeling goes away.  3. Do not drink alcohol.  4. Avoid strenuous or risky activities.  Ask for help when climbing stairs.   5. You may feel lightheaded.  If so, sit for a few minutes before standing.  Have someone help you get up.   6. You may have a slight fever. Call the doctor if your fever is over 100 F (37.7 C) (taken under the tongue) or lasts longer than 24 hours.  7. You may have a dry mouth, a sore throat, muscle aches or trouble sleeping.  These should go away after 24 hours.  8. Do not make important or legal decisions.  We don t expect you to have any problems from the surgery or treatment you had today. Just in case, here s what to do if you have pain, upset stomach (nausea), bleeding or infection:  Pain:  Take medicines your doctor has prescribed or over-the-counter medicine they have suggested. Resting and using ice packs can help, too. For surgery on an arm or leg, raise it on a pillow to ease swelling. Call your doctor if these methods don t work.  Copyright [employer], Licensed under [employer]  Upset stomach (nausea):  Take anti-nausea medicine approved by your doctor. Drink clear liquids like apple juice, ginger ale, broth or 7-Up. Be sure to drink enough fluids. Rest can help, too. Move to normal foods when you re ready. Bleeding:  In the first 24 hours, you may see a little blood on your dressing, about the size of a quarter. You don t need to worry about this much blood, but if the blood spot keeps getting bigger:    Put pressure on the wound if you can, AND    Call your doctor.  Copyright [employer], Licensed under [employer]  International  Fever/Infection: Please call your doctor if you have any of these signs:    Redness    Swelling    Wound feels warm    Pain gets worse    Bad-smelling fluid leaks from wound    Fever or chills  Call your doctor for any of the followin.  It has been over 8 to 10 hours since surgery and you are still not able to urinate (pass water).    2.  Headache for over 24 hours.    Nurse advice line: [phone]

## 2020-03-03 NOTE — ANESTHESIA CARE TRANSFER NOTE
Patient: Sang Koroma    Procedure(s):  EXCISION, lesion intranasal right side    Diagnosis: Internal nasal lesion [J34.89]  Diagnosis Additional Information: No value filed.    Anesthesia Type:   MAC     Note:  Airway :Room Air  Patient transferred to:Phase II  Handoff Report: Identifed the Patient, Identified the Reponsible Provider, Reviewed the pertinent medical history, Discussed the surgical course, Reviewed Intra-OP anesthesia mangement and issues during anesthesia, Set expectations for post-procedure period and Allowed opportunity for questions and acknowledgement of understanding      Vitals: (Last set prior to Anesthesia Care Transfer)    CRNA VITALS  3/3/2020 1057 - 3/3/2020 1157      3/3/2020             Pulse:  67    SpO2:  97 %                Electronically Signed By: RHYS Schroeder CRNA  March 3, 2020  12:21 PM

## 2020-03-03 NOTE — ANESTHESIA PREPROCEDURE EVALUATION
Anesthesia Pre-Procedure Evaluation    Patient: Sang Koroma   MRN: [id number] : 1975          Preoperative Diagnosis: Internal nasal lesion [J34.89]    Procedure(s):  EXCISION, lesion intranasal right side    History reviewed. No pertinent past medical history.  History reviewed. No pertinent surgical history.    Anesthesia Evaluation     . Pt has not had prior anesthetic            ROS/MED HX    ENT/Pulmonary: Comment: Nasal obstruction/lesion      Neurologic:  - neg neurologic ROS     Cardiovascular:  - neg cardiovascular ROS       METS/Exercise Tolerance:     Hematologic:  - neg hematologic  ROS       Musculoskeletal:  - neg musculoskeletal ROS       GI/Hepatic:  - neg GI/hepatic ROS       Renal/Genitourinary:  - ROS Renal section negative       Endo:  - neg endo ROS       Psychiatric:  - neg psychiatric ROS       Infectious Disease:  - neg infectious disease ROS       Malignancy:      - no malignancy   Other:    - neg other ROS                      Physical Exam  Normal systems: cardiovascular, pulmonary and dental    Airway   Mallampati: II  TM distance: >3 FB  Neck ROM: full    Dental     Cardiovascular   Rhythm and rate: regular and normal      Pulmonary    breath sounds clear to auscultation            Lab Results   Component Value Date    WBC 5.9 2016    HGB 15.6 2016    HCT 45.9 2016     2016     10/17/2017    POTASSIUM 4.2 10/17/2017    CHLORIDE 103 10/17/2017    CO2 30 10/17/2017    BUN 18 10/17/2017    CR 1.16 10/17/2017    GLC 71 10/17/2017    HENRY 9.6 10/17/2017    ALBUMIN 4.5 10/17/2017    PROTTOTAL 8.0 10/17/2017    ALT 46 10/17/2017    AST 21 10/17/2017    ALKPHOS 67 10/17/2017    BILITOTAL 0.6 10/17/2017       Preop Vitals  BP Readings from Last 3 Encounters:   20 (!) 131/92   20 112/78   19 118/86    Pulse Readings from Last 3 Encounters:   10/29/19 76   17 56   02/15/17 59      Resp Readings from Last 3 Encounters:  "  03/03/20 18   10/29/19 16   09/21/17 16    SpO2 Readings from Last 3 Encounters:   03/03/20 97%   10/29/19 97%   02/15/17 100%      Temp Readings from Last 1 Encounters:   03/03/20 98  F (36.7  C) (Oral)    Ht Readings from Last 1 Encounters:   02/05/20 1.702 m (5' 7\")      Wt Readings from Last 1 Encounters:   02/05/20 100.9 kg (222 lb 8 oz)    Estimated body mass index is 34.85 kg/m  as calculated from the following:    Height as of 2/5/20: 1.702 m (5' 7\").    Weight as of 2/5/20: 100.9 kg (222 lb 8 oz).       Anesthesia Plan      History & Physical Review  History and physical reviewed and following examination; no interval change.H and P by Dr. Arcos from office visit.  Dr. Arcos verbally cleared patient for anesthesia.      ASA Status:  1 .    NPO Status:  > 8 hours    Plan for MAC with Other induction. Maintenance will be Other.  Reason for MAC:  Deep or markedly invasive procedure (G8)  PONV prophylaxis:  Ondansetron (or other 5HT-3)       Postoperative Care      Consents  Anesthetic plan, risks, benefits and alternatives discussed with:  Patient.  Use of blood products discussed: No .   .                 RHYS Nuñez CRNA  "

## 2020-03-04 NOTE — OR NURSING
"[facility] Same Day Surgery  Discharge Call Back  Sang Koroma  1975  MRN: [id number]  Home: [phone] (home)   PCP: Mian Hunt    We are calling to see how you're doing since your surgery/procedure with us?   Comments: \"I am still alive and that is a good thing.\"  Clinical Questions  1. Have you had time to look at your discharge instructions? Do you have any questions in regards to the instructions?   Comment: \"yes, one question, they said they were sending the lesion in for testing, what do they test for?\"  Discussed tissue was sent in for pathology and Dr. Arcos will review pathology at follow up appointment.  2. Do you feel your pain is being controlled with the regimen the surgeon sent you home on? (ie: prescription medications, over the counter pain medications, ice packs)   Comments: \"no pain\"  3. Have you noticed any drainage on your dressing? Do you know what to do if you have bleeding as a result of your procedure?   Comments: \"no drainage\"  4. Have you had any nausea/vomiting? Do you know how to treat this?   Comment: \"no\"  5. Have you had any signs/symptoms of infection? (ie: fever, swelling, heat, drainage or redness) Do you know what to do if you have?   Comment: \"no\"  6. Do you have a follow up appointment made with your surgeon? Do you have a number to contact them at if you need it?   Comment: reviewed date and time of follow up listed on discharge instruction  Retained Foreign Object (IVONE, Hemovac, Penrose, Wound Packing, Vaginal Packing, Nasal Splints, Urethral Stents, Cartwright Catheter)  1. Do you still have  in place?   2. If the item is still in place, can you review the plan for removal with me?       You may be randomly selected to fill out a [facility] survey. We would appreciate you taking the time to fill this out. It is important to us if you would answer all of the questions on the survey.              "

## 2020-03-05 LAB — COPATH REPORT: NORMAL

## 2020-03-09 NOTE — PROGRESS NOTES
History of Present Illness - Sang Koroma is a 44 year old male presenting in clinic today for a recheck on Patient presents with:  Surgical Followup: Excision of right intranasal lesion  DOS 03-    Overall patient is feeling much better after excision of the papillomatous lesion in the right side of the nose.  He is breathing better already is using topical moisturizing ointment on a daily basis.  SPECIMEN(S):   Lesion, right nasal     FINAL DIAGNOSIS:   Nose, right side, lesion, excision:   - Epidermal hyperplasia with broad acanthosis, papillomatosis and   koilocytic change - see comment.     COMMENT:   The features are similar to those noted in the prior biopsy.  The   differential diagnosis includes squamous   papilloma and condyloma acuminatum.  There is no evidence of high grade   dysplasia or malignancy in the planes   examined.         BP Readings from Last 1 Encounters:   03/03/20 (!) 130/94       BP noted to be elevated today in office.  Patient to follow up with Primary Care provider regarding elevated blood pressure.    Sang IS NOT a smoker/uses chewing tobacco.        Past Medical History - No past medical history on file.    Current Medications -   Current Outpatient Medications:      acyclovir (ZOVIRAX) 400 MG tablet, Take 1 tablet (400 mg) by mouth 2 times daily, Disp: 180 tablet, Rfl: 3    Allergies - No Known Allergies    Social History -   Social History     Socioeconomic History     Marital status:      Spouse name: Not on file     Number of children: Not on file     Years of education: Not on file     Highest education level: Not on file   Occupational History     Not on file   Social Needs     Financial resource strain: Not on file     Food insecurity     Worry: Not on file     Inability: Not on file     Transportation needs     Medical: Not on file     Non-medical: Not on file   Tobacco Use     Smoking status: Never Smoker     Smokeless tobacco: Never Used    Substance and Sexual Activity     Alcohol use: Yes     Comment: weekends     Drug use: No     Sexual activity: Yes     Partners: Female     Birth control/protection: Condom   Lifestyle     Physical activity     Days per week: Not on file     Minutes per session: Not on file     Stress: Not on file   Relationships     Social connections     Talks on phone: Not on file     Gets together: Not on file     Attends Episcopalian service: Not on file     Active member of club or organization: Not on file     Attends meetings of clubs or organizations: Not on file     Relationship status: Not on file     Intimate partner violence     Fear of current or ex partner: Not on file     Emotionally abused: Not on file     Physically abused: Not on file     Forced sexual activity: Not on file   Other Topics Concern     Parent/sibling w/ CABG, MI or angioplasty before 65F 55M? No   Social History Narrative     Not on file       Family History -   Family History   Problem Relation Age of Onset     Cancer Maternal Grandfather      Cancer Paternal Grandmother      Cancer Paternal Grandfather      Gout Brother      Crohn's Disease Brother        Review of Systems - As per HPI and PMHx, otherwise review of system review of the head and neck negative. Otherwise 10+ review of system is negative    Physical Exam  There were no vitals taken for this visit.  BMI: There is no height or weight on file to calculate BMI.    General - The patient is well nourished and well developed, and appears to have good nutritional status.  Alert and oriented to person and place, answers questions and cooperates with examination appropriately.    SKIN - No suspicious lesions or rashes.  Respiration - No respiratory distress.  Head and Face - Normocephalic and atraumatic, with no gross asymmetry noted of the contour of the facial features.  The facial nerve is intact, with strong symmetric movements.    Voice and Breathing - The patient was breathing comfortably  without the use of accessory muscles. The patients voice was clear and strong, and had appropriate pitch and quality.    Eyes - Extraocular movements intact.  Sclera were not icteric or injected, conjunctiva were pink and moist.      Nose - External contour is symmetric, no gross deflection or scars.  Nasal mucosa is pink and moist with no abnormal mucus.  The septum was midline and non-obstructive, turbinates of normal size and position.  No polyps, masses, or purulence noted on examination.  Today debrided some of the scabs noted on the lateral vestibule where the lesion was excised.  The defect from the excision is granulating very well.  No evidence of infection.      Performed in clinic today:  No procedures preformed in clinic today      A/P - Sang Koroma is a 44 year old male Patient presents with:  Surgical Followup: Excision of right intranasal lesion  DOS 03-    Patient is doing well after excision of large papillomatous lesion from his right nose.  He continue application of topical ointment for the next 5 days.  Would like to see him back in 3 weeks for reexam.        Estuardo Arcos MD

## 2020-03-11 ENCOUNTER — OFFICE VISIT (OUTPATIENT)
Dept: OTOLARYNGOLOGY | Facility: OTHER | Age: 45
End: 2020-03-11
Payer: COMMERCIAL

## 2020-03-11 VITALS
WEIGHT: 220.5 LBS | BODY MASS INDEX: 34.61 KG/M2 | DIASTOLIC BLOOD PRESSURE: 80 MMHG | HEIGHT: 67 IN | SYSTOLIC BLOOD PRESSURE: 110 MMHG

## 2020-03-11 DIAGNOSIS — Z98.890 POSTOPERATIVE STATE: Primary | ICD-10-CM

## 2020-03-11 PROCEDURE — 99024 POSTOP FOLLOW-UP VISIT: CPT | Performed by: OTOLARYNGOLOGY

## 2020-03-11 ASSESSMENT — MIFFLIN-ST. JEOR: SCORE: 1858.81

## 2020-03-11 NOTE — LETTER
3/11/2020         RE: aSng Koroma  [address] [facility] [address]        Dear Colleague,    Thank you for referring your patient, Sang Koroma, to the [facility]. Please see a copy of my visit note below.    History of Present Illness - Sang Koroma is a 44 year old male presenting in clinic today for a recheck on Patient presents with:  Surgical Followup: Excision of right intranasal lesion  DOS 03-    Overall patient is feeling much better after excision of the papillomatous lesion in the right side of the nose.  He is breathing better already is using topical moisturizing ointment on a daily basis.  SPECIMEN(S):   Lesion, right nasal     FINAL DIAGNOSIS:   Nose, right side, lesion, excision:   - Epidermal hyperplasia with broad acanthosis, papillomatosis and   koilocytic change - see comment.     COMMENT:   The features are similar to those noted in the prior biopsy.  The   differential diagnosis includes squamous   papilloma and condyloma acuminatum.  There is no evidence of high grade   dysplasia or malignancy in the planes   examined.         BP Readings from Last 1 Encounters:   03/03/20 (!) 130/94       BP noted to be elevated today in office.  Patient to follow up with Primary Care provider regarding elevated blood pressure.    Sang IS NOT a smoker/uses chewing tobacco.        Past Medical History - No past medical history on file.    Current Medications -   Current Outpatient Medications:      acyclovir (ZOVIRAX) 400 MG tablet, Take 1 tablet (400 mg) by mouth 2 times daily, Disp: 180 tablet, Rfl: 3    Allergies - No Known Allergies    Social History -   Social History     Socioeconomic History     Marital status:      Spouse name: Not on file     Number of children: Not on file     Years of education: Not on file     Highest education level: Not on file   Occupational History     Not on file   Social Needs     Financial resource  strain: Not on file     Food insecurity     Worry: Not on file     Inability: Not on file     Transportation needs     Medical: Not on file     Non-medical: Not on file   Tobacco Use     Smoking status: Never Smoker     Smokeless tobacco: Never Used   Substance and Sexual Activity     Alcohol use: Yes     Comment: weekends     Drug use: No     Sexual activity: Yes     Partners: Female     Birth control/protection: Condom   Lifestyle     Physical activity     Days per week: Not on file     Minutes per session: Not on file     Stress: Not on file   Relationships     Social connections     Talks on phone: Not on file     Gets together: Not on file     Attends [employer]an service: Not on file     Active member of club or organization: Not on file     Attends meetings of clubs or organizations: Not on file     Relationship status: Not on file     Intimate partner violence     Fear of current or ex partner: Not on file     Emotionally abused: Not on file     Physically abused: Not on file     Forced sexual activity: Not on file   Other Topics Concern     Parent/sibling w/ CABG, MI or angioplasty before 65F 55M? No   Social History Narrative     Not on file       Family History -   Family History   Problem Relation Age of Onset     Cancer Maternal Grandfather      Cancer Paternal Grandmother      Cancer Paternal Grandfather      Gout Brother      Crohn's Disease Brother        Review of Systems - As per HPI and PMHx, otherwise review of system review of the head and neck negative. Otherwise 10+ review of system is negative    Physical Exam  There were no vitals taken for this visit.  BMI: There is no height or weight on file to calculate BMI.    General - The patient is well nourished and well developed, and appears to have good nutritional status.  Alert and oriented to person and place, answers questions and cooperates with examination appropriately.    SKIN - No suspicious lesions or rashes.  Respiration - No respiratory  distress.  Head and Face - Normocephalic and atraumatic, with no gross asymmetry noted of the contour of the facial features.  The facial nerve is intact, with strong symmetric movements.    Voice and Breathing - The patient was breathing comfortably without the use of accessory muscles. The patients voice was clear and strong, and had appropriate pitch and quality.    Eyes - Extraocular movements intact.  Sclera were not icteric or injected, conjunctiva were pink and moist.      Nose - External contour is symmetric, no gross deflection or scars.  Nasal mucosa is pink and moist with no abnormal mucus.  The septum was midline and non-obstructive, turbinates of normal size and position.  No polyps, masses, or purulence noted on examination.  Today debrided some of the scabs noted on the lateral vestibule where the lesion was excised.  The defect from the excision is granulating very well.  No evidence of infection.      Performed in clinic today:  No procedures preformed in clinic today      A/P - Sang Koroma is a 44 year old male Patient presents with:  Surgical Followup: Excision of right intranasal lesion  DOS 03-    Patient is doing well after excision of large papillomatous lesion from his right nose.  He continue application of topical ointment for the next 5 days.  Would like to see him back in 3 weeks for reexam.        Estuardo Arcos MD        Again, thank you for allowing me to participate in the care of your patient.        Sincerely,        Estuardo Arcos MD, MD

## 2020-03-27 NOTE — PROGRESS NOTES
"Sang Koroma is a 44 year old male who is being evaluated via a billable telephone visit.      The patient has been notified of following:     \"This telephone visit will be conducted via a call between you and your physician/provider. We have found that certain health care needs can be provided without the need for a physical exam.  This service lets us provide the care you need with a short phone conversation.  If a prescription is necessary we can send it directly to your pharmacy.  If lab work is needed we can place an order for that and you can then stop by our lab to have the test done at a later time.    If during the course of the call the physician/provider feels a telephone visit is not appropriate, you will not be charged for this service.\"     Physician has received verbal consent for a Telephone Visit from the patient? Yes    Sang Koroma complains of  No chief complaint on file.      I have reviewed and updated the patient's Past Medical History, Social History, Family History and Medication List.    ALLERGIES  Patient has no known allergies.    Additional provider notes: Patient status post excision of large papillomatous lesion which was benign on the 303 20 on the right nostril.  He is breathing much much better has not had any nosebleeds still with a scab over the area where the tissue was excised but is using Vaseline to keep it moist.  There is been no pain.  No alteration in smell or taste.    Assessment/Plan:  Patient is doing very well after excision of large intranasal right sided papilloma.  He will continue to use topical Vaseline until the scab complete disappears.  I would like to see him back in 2 months.  Phone call duration: 10 minutes    Estuardo Arcos MD        "

## 2020-04-01 ENCOUNTER — VIRTUAL VISIT (OUTPATIENT)
Dept: OTOLARYNGOLOGY | Facility: OTHER | Age: 45
End: 2020-04-01
Payer: COMMERCIAL

## 2020-04-01 VITALS — HEIGHT: 67 IN | WEIGHT: 220 LBS | BODY MASS INDEX: 34.53 KG/M2

## 2020-04-01 DIAGNOSIS — Z98.890 POSTOPERATIVE STATE: Primary | ICD-10-CM

## 2020-04-01 PROCEDURE — 99024 POSTOP FOLLOW-UP VISIT: CPT | Performed by: OTOLARYNGOLOGY

## 2020-04-01 ASSESSMENT — MIFFLIN-ST. JEOR: SCORE: 1856.54

## 2020-12-06 ENCOUNTER — HEALTH MAINTENANCE LETTER (OUTPATIENT)
Age: 45
End: 2020-12-06

## 2021-02-09 ENCOUNTER — OFFICE VISIT (OUTPATIENT)
Dept: FAMILY MEDICINE | Facility: OTHER | Age: 46
End: 2021-02-09
Payer: COMMERCIAL

## 2021-02-09 VITALS
TEMPERATURE: 97.8 F | BODY MASS INDEX: 33.74 KG/M2 | HEIGHT: 67 IN | HEART RATE: 56 BPM | DIASTOLIC BLOOD PRESSURE: 74 MMHG | SYSTOLIC BLOOD PRESSURE: 126 MMHG | OXYGEN SATURATION: 98 % | WEIGHT: 215 LBS
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
## 2021-02-09 DIAGNOSIS — M62.838 NECK MUSCLE SPASM: Primary | ICD-10-CM

## 2021-02-09 PROCEDURE — 99213 OFFICE O/P EST LOW 20 MIN: CPT | Performed by: FAMILY MEDICINE

## 2021-02-09 RX ORDER — CYCLOBENZAPRINE HCL 5 MG
5-10 TABLET ORAL
Qty: 60 TABLET | Refills: 0 | Status: SHIPPED | OUTPATIENT
Start: 2021-02-09

## 2021-02-09 ASSESSMENT — MIFFLIN-ST. JEOR: SCORE: 1828.86

## 2021-02-09 ASSESSMENT — PAIN SCALES - GENERAL: PAINLEVEL: EXTREME PAIN (9)
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
## 2021-03-09 ENCOUNTER — THERAPY VISIT (OUTPATIENT)
Dept: PHYSICAL THERAPY | Facility: CLINIC | Age: 46
End: 2021-03-09
Payer: COMMERCIAL

## 2021-03-09 DIAGNOSIS — M54.2 NECK PAIN: ICD-10-CM
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
## 2021-03-09 PROCEDURE — 97140 MANUAL THERAPY 1/> REGIONS: CPT | Mod: GP | Performed by: PHYSICAL THERAPIST

## 2021-03-09 PROCEDURE — 97112 NEUROMUSCULAR REEDUCATION: CPT | Mod: GP | Performed by: PHYSICAL THERAPIST

## 2021-03-09 PROCEDURE — 97161 PT EVAL LOW COMPLEX 20 MIN: CPT | Mod: GP | Performed by: PHYSICAL THERAPIST
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
## 2021-07-27 PROBLEM — M54.2 NECK PAIN: Status: RESOLVED | Noted: 2021-03-09 | Resolved: 2021-07-27

## 2021-07-27 NOTE — PROGRESS NOTES
Patient seen for one time evaluation and treatment.  Patient did not return for further treatment and current status is unknown.  Please see initial evaluation for further information.    Oliver Scott,PT, DPT, OCS

## 2021-09-26 ENCOUNTER — HEALTH MAINTENANCE LETTER (OUTPATIENT)
Age: 46
End: 2021-09-26

## 2021-10-04 NOTE — PROGRESS NOTES
"    Assessment & Plan     Need for prophylactic vaccination and inoculation against influenza    - INFLUENZA VACCINE IM > 6 MONTHS VALENT IIV4 (AFLURIA/FLUZONE)    Screen for STD (sexually transmitted disease)  46-year-old otherwise healthy male presenting to get STD screening test done as he has new sexual partners.  No symptoms.  He does have a history of genital herpes and HPV infection.  Will follow results and treat accordingly.  - NEISSERIA GONORRHOEA PCR; Future  - CHLAMYDIA TRACHOMATIS PCR; Future  - HIV Antigen Antibody Combo; Future  - Treponema Abs w Reflex to RPR and Titer; Future    Elevated blood pressures without diagnosis of hypertension  Advised a recheck.  Encouraged low-fat diet and regular exercise help improve this.  Advised to follow-up in the next 1 to 2 months if noted to have persistently elevated blood pressures.       BMI:   Estimated body mass index is 34.07 kg/m  as calculated from the following:    Height as of 2/9/21: 1.702 m (5' 7\").    Weight as of this encounter: 98.7 kg (217 lb 8 oz).   Weight management plan: Discussed healthy diet and exercise guidelines      Selam Sandoval MD  [facility] JEFFERY Andres is a 46 year old who presents for the following health issues     History of Present Illness       He eats 0-1 servings of fruits and vegetables daily.He consumes 1 sweetened beverage(s) daily.He exercises with enough effort to increase his heart rate 9 or less minutes per day.  He exercises with enough effort to increase his heart rate 3 or less days per week. He is missing 1 dose(s) of medications per week.       Patient wanting to get a full STD tested. He isn't having any symptoms. He switched sexual partners and wanting to make sure is not positive for anything.     Review of Systems   Constitutional, HEENT, cardiovascular, pulmonary, gi and gu systems are negative, except as otherwise noted.      Objective    BP (!) 120/100   Pulse 85   Temp " 97.7  F (36.5  C) (Temporal)   Resp 16   Wt 98.7 kg (217 lb 8 oz)   SpO2 97%   BMI 34.07 kg/m    Body mass index is 34.07 kg/m .  Physical Exam   GENERAL: healthy, alert and no distress  NECK: no adenopathy, no asymmetry, masses, or scars and thyroid normal to palpation  RESP: lungs clear to auscultation - no rales, rhonchi or wheezes  CV: regular rate and rhythm, normal S1 S2, no S3 or S4, no murmur, click or rub, no peripheral edema and peripheral pulses strong        Answers for HPI/ROS submitted by the patient on 10/5/2021  If you checked off any problems, how difficult have these problems made it for you to do your work, take care of things at home, or get along with other people?: Not difficult at all  PHQ9 TOTAL SCORE: 7

## 2021-10-05 ENCOUNTER — OFFICE VISIT (OUTPATIENT)
Dept: FAMILY MEDICINE | Facility: OTHER | Age: 46
End: 2021-10-05
Payer: COMMERCIAL

## 2021-10-05 VITALS
OXYGEN SATURATION: 97 % | SYSTOLIC BLOOD PRESSURE: 118 MMHG | WEIGHT: 217.5 LBS | BODY MASS INDEX: 34.07 KG/M2 | TEMPERATURE: 97.7 F | DIASTOLIC BLOOD PRESSURE: 82 MMHG | HEART RATE: 85 BPM | RESPIRATION RATE: 16 BRPM

## 2021-10-05 DIAGNOSIS — Z23 NEED FOR PROPHYLACTIC VACCINATION AND INOCULATION AGAINST INFLUENZA: ICD-10-CM

## 2021-10-05 DIAGNOSIS — R03.0 ELEVATED BLOOD PRESSURE READING WITHOUT DIAGNOSIS OF HYPERTENSION: ICD-10-CM

## 2021-10-05 DIAGNOSIS — Z11.3 SCREEN FOR STD (SEXUALLY TRANSMITTED DISEASE): Primary | ICD-10-CM

## 2021-10-05 PROCEDURE — 87491 CHLMYD TRACH DNA AMP PROBE: CPT | Performed by: FAMILY MEDICINE

## 2021-10-05 PROCEDURE — 99213 OFFICE O/P EST LOW 20 MIN: CPT | Mod: 25 | Performed by: FAMILY MEDICINE

## 2021-10-05 PROCEDURE — 90471 IMMUNIZATION ADMIN: CPT | Performed by: FAMILY MEDICINE

## 2021-10-05 PROCEDURE — 87389 HIV-1 AG W/HIV-1&-2 AB AG IA: CPT | Performed by: FAMILY MEDICINE

## 2021-10-05 PROCEDURE — 36415 COLL VENOUS BLD VENIPUNCTURE: CPT | Performed by: FAMILY MEDICINE

## 2021-10-05 PROCEDURE — 90686 IIV4 VACC NO PRSV 0.5 ML IM: CPT | Performed by: FAMILY MEDICINE

## 2021-10-05 PROCEDURE — 86780 TREPONEMA PALLIDUM: CPT | Performed by: FAMILY MEDICINE

## 2021-10-05 PROCEDURE — 87591 N.GONORRHOEAE DNA AMP PROB: CPT | Performed by: FAMILY MEDICINE

## 2021-10-05 ASSESSMENT — PATIENT HEALTH QUESTIONNAIRE - PHQ9
10. IF YOU CHECKED OFF ANY PROBLEMS, HOW DIFFICULT HAVE THESE PROBLEMS MADE IT FOR YOU TO DO YOUR WORK, TAKE CARE OF THINGS AT HOME, OR GET ALONG WITH OTHER PEOPLE: NOT DIFFICULT AT ALL
SUM OF ALL RESPONSES TO PHQ QUESTIONS 1-9: 7
SUM OF ALL RESPONSES TO PHQ QUESTIONS 1-9: 7

## 2021-10-06 LAB
C TRACH DNA SPEC QL NAA+PROBE: NEGATIVE
HIV 1+2 AB+HIV1 P24 AG SERPL QL IA: NONREACTIVE
N GONORRHOEA DNA SPEC QL NAA+PROBE: NEGATIVE
T PALLIDUM AB SER QL: NONREACTIVE

## 2021-10-06 ASSESSMENT — PATIENT HEALTH QUESTIONNAIRE - PHQ9: SUM OF ALL RESPONSES TO PHQ QUESTIONS 1-9: 7

## 2021-10-07 NOTE — RESULT ENCOUNTER NOTE
Mr. Koroma    Your recent test results are attached.  Negative gonorrhea, chlamydia, HIV and syphilis test.    If you have any questions or concerns please contact me via My Chart or call the clinic at [phone]     Thank You  Selam Sandoval MD.

## 2022-01-15 ENCOUNTER — HEALTH MAINTENANCE LETTER (OUTPATIENT)
Age: 47
End: 2022-01-15

## 2023-04-23 ENCOUNTER — HEALTH MAINTENANCE LETTER (OUTPATIENT)
Age: 48
End: 2023-04-23

## 2024-06-29 ENCOUNTER — HEALTH MAINTENANCE LETTER (OUTPATIENT)
Age: 49
End: 2024-06-29

## (undated) DEVICE — BLADE KNIFE SURG 15 371115

## (undated) DEVICE — PACK HEAD NECK CUSTOM

## (undated) DEVICE — SPONGE COTTONOID 1/2X3" 80-1407

## (undated) DEVICE — TAPE TRANSPORE 1/2"

## (undated) DEVICE — GLOVE PROTEXIS W/NEU-THERA 8.0  2D73TE80

## (undated) RX ORDER — EPINEPHRINE 1 MG/ML
INJECTION, SOLUTION INTRAMUSCULAR; SUBCUTANEOUS
Status: DISPENSED
Start: 2020-03-03

## (undated) RX ORDER — PROPOFOL 10 MG/ML
INJECTION, EMULSION INTRAVENOUS
Status: DISPENSED
Start: 2020-03-03

## (undated) RX ORDER — LIDOCAINE HYDROCHLORIDE 20 MG/ML
INJECTION, SOLUTION EPIDURAL; INFILTRATION; INTRACAUDAL; PERINEURAL
Status: DISPENSED
Start: 2020-03-03

## (undated) RX ORDER — GINSENG 100 MG
CAPSULE ORAL
Status: DISPENSED
Start: 2020-03-03

## (undated) RX ORDER — FENTANYL CITRATE 50 UG/ML
INJECTION, SOLUTION INTRAMUSCULAR; INTRAVENOUS
Status: DISPENSED
Start: 2020-03-03

## (undated) RX ORDER — LIDOCAINE HYDROCHLORIDE AND EPINEPHRINE 10; 10 MG/ML; UG/ML
INJECTION, SOLUTION INFILTRATION; PERINEURAL
Status: DISPENSED
Start: 2020-03-03

## (undated) RX ORDER — OXYMETAZOLINE HYDROCHLORIDE 0.05 G/100ML
SPRAY NASAL
Status: DISPENSED
Start: 2020-03-03